# Patient Record
Sex: FEMALE | Race: OTHER | NOT HISPANIC OR LATINO | Employment: UNEMPLOYED | RURAL
[De-identification: names, ages, dates, MRNs, and addresses within clinical notes are randomized per-mention and may not be internally consistent; named-entity substitution may affect disease eponyms.]

---

## 2021-08-23 ENCOUNTER — HOSPITAL ENCOUNTER (EMERGENCY)
Facility: HOSPITAL | Age: 56
Discharge: HOME | End: 2021-08-24
Attending: FAMILY MEDICINE
Payer: MEDICARE

## 2021-08-23 VITALS
RESPIRATION RATE: 16 BRPM | OXYGEN SATURATION: 97 % | HEART RATE: 59 BPM | TEMPERATURE: 98 F | SYSTOLIC BLOOD PRESSURE: 171 MMHG | DIASTOLIC BLOOD PRESSURE: 90 MMHG | HEIGHT: 63 IN | WEIGHT: 165 LBS | BODY MASS INDEX: 29.23 KG/M2
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
## 2021-08-23 DIAGNOSIS — S42.215A CLOSED NONDISPLACED FRACTURE OF SURGICAL NECK OF LEFT HUMERUS, UNSPECIFIED FRACTURE MORPHOLOGY, INITIAL ENCOUNTER: Primary | ICD-10-CM

## 2021-08-23 DIAGNOSIS — R42 DIZZINESS: ICD-10-CM

## 2021-08-23 LAB
ALBUMIN SERPL BCP-MCNC: 3.3 G/DL (ref 3.5–5)
ALBUMIN/GLOB SERPL: 0.5 {RATIO}
ALP SERPL-CCNC: 230 U/L (ref 46–118)
ALT SERPL W P-5'-P-CCNC: 25 U/L (ref 13–56)
ANION GAP SERPL CALCULATED.3IONS-SCNC: 9 MMOL/L (ref 7–16)
AST SERPL W P-5'-P-CCNC: 39 U/L (ref 15–37)
BASOPHILS # BLD AUTO: 0.01 K/UL (ref 0–0.2)
BASOPHILS NFR BLD AUTO: 0.2 % (ref 0–1)
BILIRUB SERPL-MCNC: 0.8 MG/DL (ref 0–1.2)
BUN SERPL-MCNC: 26 MG/DL (ref 7–18)
BUN/CREAT SERPL: 6 (ref 6–20)
CALCIUM SERPL-MCNC: 8.6 MG/DL (ref 8.5–10.1)
CHLORIDE SERPL-SCNC: 95 MMOL/L (ref 98–107)
CO2 SERPL-SCNC: 33 MMOL/L (ref 21–32)
CREAT SERPL-MCNC: 4.32 MG/DL (ref 0.55–1.02)
DIFFERENTIAL METHOD BLD: ABNORMAL
EOSINOPHIL # BLD AUTO: 0.21 K/UL (ref 0–0.5)
EOSINOPHIL NFR BLD AUTO: 4 % (ref 1–4)
ERYTHROCYTE [DISTWIDTH] IN BLOOD BY AUTOMATED COUNT: 15.4 % (ref 11.5–14.5)
GLOBULIN SER-MCNC: 6.8 G/DL (ref 2–4)
GLUCOSE SERPL-MCNC: 100 MG/DL (ref 74–106)
GLUCOSE SERPL-MCNC: 88 MG/DL (ref 70–105)
HCT VFR BLD AUTO: 32.6 % (ref 38–47)
HGB BLD-MCNC: 11.2 G/DL (ref 12–16)
IMM GRANULOCYTES # BLD AUTO: 0.03 K/UL (ref 0–0.04)
IMM GRANULOCYTES NFR BLD: 0.6 % (ref 0–0.4)
LYMPHOCYTES # BLD AUTO: 1.28 K/UL (ref 1–4.8)
LYMPHOCYTES NFR BLD AUTO: 24.2 % (ref 27–41)
MCH RBC QN AUTO: 32.7 PG (ref 27–31)
MCHC RBC AUTO-ENTMCNC: 34.4 G/DL (ref 32–36)
MCV RBC AUTO: 95.3 FL (ref 80–96)
MONOCYTES # BLD AUTO: 0.45 K/UL (ref 0–0.8)
MONOCYTES NFR BLD AUTO: 8.5 % (ref 2–6)
MPC BLD CALC-MCNC: 10.4 FL (ref 9.4–12.4)
NEUTROPHILS # BLD AUTO: 3.31 K/UL (ref 1.8–7.7)
NEUTROPHILS NFR BLD AUTO: 62.5 % (ref 53–65)
NRBC # BLD AUTO: 0 X10E3/UL
NRBC, AUTO (.00): 0 %
PLATELET # BLD AUTO: 119 K/UL (ref 150–400)
POTASSIUM SERPL-SCNC: 4.4 MMOL/L (ref 3.5–5.1)
PROT SERPL-MCNC: 10.1 G/DL (ref 6.4–8.2)
RBC # BLD AUTO: 3.42 M/UL (ref 4.2–5.4)
SODIUM SERPL-SCNC: 133 MMOL/L (ref 136–145)
WBC # BLD AUTO: 5.29 K/UL (ref 4.5–11)

## 2021-08-23 PROCEDURE — 96374 THER/PROPH/DIAG INJ IV PUSH: CPT

## 2021-08-23 PROCEDURE — 93005 ELECTROCARDIOGRAM TRACING: CPT

## 2021-08-23 PROCEDURE — 80053 COMPREHEN METABOLIC PANEL: CPT | Performed by: FAMILY MEDICINE

## 2021-08-23 PROCEDURE — 99284 EMERGENCY DEPT VISIT MOD MDM: CPT | Mod: ,,, | Performed by: FAMILY MEDICINE

## 2021-08-23 PROCEDURE — 36415 COLL VENOUS BLD VENIPUNCTURE: CPT | Performed by: FAMILY MEDICINE

## 2021-08-23 PROCEDURE — 93010 EKG 12-LEAD: ICD-10-PCS | Mod: ,,, | Performed by: HOSPITALIST

## 2021-08-23 PROCEDURE — 96375 TX/PRO/DX INJ NEW DRUG ADDON: CPT

## 2021-08-23 PROCEDURE — 85025 COMPLETE CBC W/AUTO DIFF WBC: CPT | Performed by: FAMILY MEDICINE

## 2021-08-23 PROCEDURE — 99284 PR EMERGENCY DEPT VISIT,LEVEL IV: ICD-10-PCS | Mod: ,,, | Performed by: FAMILY MEDICINE

## 2021-08-23 PROCEDURE — 63600175 PHARM REV CODE 636 W HCPCS: Performed by: FAMILY MEDICINE

## 2021-08-23 PROCEDURE — 99285 EMERGENCY DEPT VISIT HI MDM: CPT | Mod: 25

## 2021-08-23 PROCEDURE — 93010 ELECTROCARDIOGRAM REPORT: CPT | Mod: ,,, | Performed by: HOSPITALIST

## 2021-08-23 PROCEDURE — 82962 GLUCOSE BLOOD TEST: CPT

## 2021-08-23 RX ORDER — MORPHINE SULFATE 4 MG/ML
4 INJECTION, SOLUTION INTRAMUSCULAR; INTRAVENOUS
Status: COMPLETED | OUTPATIENT
Start: 2021-08-23 | End: 2021-08-23

## 2021-08-23 RX ORDER — ONDANSETRON 2 MG/ML
4 INJECTION INTRAMUSCULAR; INTRAVENOUS
Status: COMPLETED | OUTPATIENT
Start: 2021-08-23 | End: 2021-08-23

## 2021-08-23 RX ORDER — HYDROCODONE BITARTRATE AND ACETAMINOPHEN 10; 325 MG/1; MG/1
1 TABLET ORAL EVERY 6 HOURS PRN
Qty: 15 TABLET | Refills: 0 | Status: SHIPPED | OUTPATIENT
Start: 2021-08-23 | End: 2022-03-03

## 2021-08-23 RX ADMIN — MORPHINE SULFATE 4 MG: 4 INJECTION INTRAVENOUS at 11:08

## 2021-08-23 RX ADMIN — ONDANSETRON 4 MG: 2 INJECTION INTRAMUSCULAR; INTRAVENOUS at 11:08

## 2021-09-02 ENCOUNTER — HOSPITAL ENCOUNTER (OUTPATIENT)
Dept: RADIOLOGY | Facility: HOSPITAL | Age: 56
Discharge: HOME | End: 2021-09-02
Attending: ORTHOPAEDIC SURGERY
Payer: MEDICARE

## 2021-09-02 DIAGNOSIS — M25.512 ACUTE PAIN OF LEFT SHOULDER: ICD-10-CM

## 2021-09-02 PROBLEM — S42.222A CLOSED 2-PART DISPLACED FRACTURE OF SURGICAL NECK OF LEFT HUMERUS: Status: ACTIVE | Noted: 2021-09-02

## 2021-09-02 PROCEDURE — 73030 X-RAY EXAM OF SHOULDER: CPT | Mod: TC,LT

## 2021-09-30 ENCOUNTER — HOSPITAL ENCOUNTER (OUTPATIENT)
Dept: RADIOLOGY | Facility: HOSPITAL | Age: 56
Discharge: HOME | End: 2021-09-30
Attending: ORTHOPAEDIC SURGERY
Payer: MEDICARE

## 2021-09-30 DIAGNOSIS — Z47.89 ORTHOPEDIC AFTERCARE: ICD-10-CM

## 2021-09-30 PROCEDURE — 73030 X-RAY EXAM OF SHOULDER: CPT | Mod: TC,LT

## 2021-10-21 ENCOUNTER — HOSPITAL ENCOUNTER (OUTPATIENT)
Dept: RADIOLOGY | Facility: HOSPITAL | Age: 56
Discharge: HOME | End: 2021-10-21
Attending: ORTHOPAEDIC SURGERY
Payer: MEDICARE

## 2021-10-21 DIAGNOSIS — Z47.89 ORTHOPEDIC AFTERCARE: ICD-10-CM

## 2021-10-21 PROCEDURE — 73030 X-RAY EXAM OF SHOULDER: CPT | Mod: TC,LT

## 2021-11-18 ENCOUNTER — HOSPITAL ENCOUNTER (OUTPATIENT)
Dept: RADIOLOGY | Facility: HOSPITAL | Age: 56
Discharge: HOME | End: 2021-11-18
Attending: ORTHOPAEDIC SURGERY
Payer: MEDICARE

## 2021-11-18 DIAGNOSIS — Z47.89 ORTHOPEDIC AFTERCARE: ICD-10-CM

## 2021-11-18 PROCEDURE — 73030 X-RAY EXAM OF SHOULDER: CPT | Mod: TC,LT

## 2021-12-21 ENCOUNTER — HOSPITAL ENCOUNTER (OUTPATIENT)
Dept: RADIOLOGY | Facility: HOSPITAL | Age: 56
Discharge: HOME | End: 2021-12-21
Attending: ORTHOPAEDIC SURGERY
Payer: MEDICARE

## 2021-12-21 DIAGNOSIS — Z47.89 ORTHOPEDIC AFTERCARE: ICD-10-CM

## 2021-12-21 PROCEDURE — 73030 X-RAY EXAM OF SHOULDER: CPT | Mod: TC,LT

## 2021-12-28 ENCOUNTER — TELEPHONE (OUTPATIENT)
Dept: TRANSPLANT | Facility: CLINIC | Age: 56
End: 2021-12-28

## 2022-01-11 ENCOUNTER — TELEPHONE (OUTPATIENT)
Dept: TRANSPLANT | Facility: CLINIC | Age: 57
End: 2022-01-11
Payer: MEDICARE

## 2022-01-13 DIAGNOSIS — Z76.82 ORGAN TRANSPLANT CANDIDATE: Primary | ICD-10-CM

## 2022-01-20 ENCOUNTER — HOSPITAL ENCOUNTER (OUTPATIENT)
Dept: RADIOLOGY | Facility: HOSPITAL | Age: 57
Discharge: HOME | End: 2022-01-20
Attending: ORTHOPAEDIC SURGERY
Payer: MEDICARE

## 2022-01-20 DIAGNOSIS — Z47.89 ORTHOPEDIC AFTERCARE: ICD-10-CM

## 2022-01-20 PROCEDURE — 73030 X-RAY EXAM OF SHOULDER: CPT | Mod: TC,LT

## 2022-01-25 ENCOUNTER — TELEPHONE (OUTPATIENT)
Dept: TRANSPLANT | Facility: CLINIC | Age: 57
End: 2022-01-25
Payer: MEDICARE

## 2022-03-03 ENCOUNTER — HOSPITAL ENCOUNTER (OUTPATIENT)
Dept: RADIOLOGY | Facility: HOSPITAL | Age: 57
Discharge: HOME | End: 2022-03-03
Attending: NURSE PRACTITIONER
Payer: MEDICARE

## 2022-03-03 ENCOUNTER — TELEPHONE (OUTPATIENT)
Dept: TRANSPLANT | Facility: CLINIC | Age: 57
End: 2022-03-03
Payer: MEDICARE

## 2022-03-03 ENCOUNTER — OFFICE VISIT (OUTPATIENT)
Dept: TRANSPLANT | Facility: CLINIC | Age: 57
End: 2022-03-03
Payer: MEDICARE

## 2022-03-03 ENCOUNTER — DOCUMENTATION ONLY (OUTPATIENT)
Dept: TRANSPLANT | Facility: CLINIC | Age: 57
End: 2022-03-03
Payer: MEDICARE

## 2022-03-03 VITALS
HEART RATE: 69 BPM | RESPIRATION RATE: 17 BRPM | TEMPERATURE: 97 F | BODY MASS INDEX: 30.76 KG/M2 | HEIGHT: 62 IN | WEIGHT: 167.13 LBS | OXYGEN SATURATION: 95 % | SYSTOLIC BLOOD PRESSURE: 170 MMHG | DIASTOLIC BLOOD PRESSURE: 76 MMHG

## 2022-03-03 DIAGNOSIS — Z76.82 ORGAN TRANSPLANT CANDIDATE: ICD-10-CM

## 2022-03-03 DIAGNOSIS — G45.9 TIA (TRANSIENT ISCHEMIC ATTACK): ICD-10-CM

## 2022-03-03 DIAGNOSIS — I10 ESSENTIAL HYPERTENSION: ICD-10-CM

## 2022-03-03 DIAGNOSIS — N18.6 ESRD (END STAGE RENAL DISEASE): ICD-10-CM

## 2022-03-03 DIAGNOSIS — Z01.818 PRE-TRANSPLANT EVALUATION FOR KIDNEY TRANSPLANT: Primary | ICD-10-CM

## 2022-03-03 PROCEDURE — 99203 OFFICE O/P NEW LOW 30 MIN: CPT | Mod: S$PBB,TXP,, | Performed by: TRANSPLANT SURGERY

## 2022-03-03 PROCEDURE — 72170 X-RAY EXAM OF PELVIS: CPT | Mod: TC,TXP

## 2022-03-03 PROCEDURE — 71046 XR CHEST PA AND LATERAL: ICD-10-PCS | Mod: 26,TXP,, | Performed by: RADIOLOGY

## 2022-03-03 PROCEDURE — 76770 US EXAM ABDO BACK WALL COMP: CPT | Mod: TC,TXP

## 2022-03-03 PROCEDURE — 99999 PR PBB SHADOW E&M-EST. PATIENT-LVL IV: ICD-10-PCS | Mod: PBBFAC,TXP,, | Performed by: NURSE PRACTITIONER

## 2022-03-03 PROCEDURE — 99214 OFFICE O/P EST MOD 30 MIN: CPT | Mod: PBBFAC,25,TXP | Performed by: NURSE PRACTITIONER

## 2022-03-03 PROCEDURE — 72170 XR PELVIS ROUTINE AP: ICD-10-PCS | Mod: 26,TXP,, | Performed by: RADIOLOGY

## 2022-03-03 PROCEDURE — 99999 PR PBB SHADOW E&M-EST. PATIENT-LVL IV: CPT | Mod: PBBFAC,TXP,, | Performed by: NURSE PRACTITIONER

## 2022-03-03 PROCEDURE — 71046 X-RAY EXAM CHEST 2 VIEWS: CPT | Mod: TC,TXP

## 2022-03-03 PROCEDURE — 72170 X-RAY EXAM OF PELVIS: CPT | Mod: 26,TXP,, | Performed by: RADIOLOGY

## 2022-03-03 PROCEDURE — 71046 X-RAY EXAM CHEST 2 VIEWS: CPT | Mod: 26,TXP,, | Performed by: RADIOLOGY

## 2022-03-03 PROCEDURE — 93978 US DOPP ILIACS BILATERAL: ICD-10-PCS | Mod: 26,TXP,, | Performed by: STUDENT IN AN ORGANIZED HEALTH CARE EDUCATION/TRAINING PROGRAM

## 2022-03-03 PROCEDURE — 93978 VASCULAR STUDY: CPT | Mod: 26,TXP,, | Performed by: STUDENT IN AN ORGANIZED HEALTH CARE EDUCATION/TRAINING PROGRAM

## 2022-03-03 PROCEDURE — 93978 VASCULAR STUDY: CPT | Mod: TC,TXP

## 2022-03-03 PROCEDURE — 76770 US RETROPERITONEAL COMPLETE: ICD-10-PCS | Mod: 26,TXP,, | Performed by: STUDENT IN AN ORGANIZED HEALTH CARE EDUCATION/TRAINING PROGRAM

## 2022-03-03 PROCEDURE — 99205 PR OFFICE/OUTPT VISIT, NEW, LEVL V, 60-74 MIN: ICD-10-PCS | Mod: S$PBB,TXP,, | Performed by: NURSE PRACTITIONER

## 2022-03-03 PROCEDURE — 99203 PR OFFICE/OUTPT VISIT, NEW, LEVL III, 30-44 MIN: ICD-10-PCS | Mod: S$PBB,TXP,, | Performed by: TRANSPLANT SURGERY

## 2022-03-03 PROCEDURE — 76770 US EXAM ABDO BACK WALL COMP: CPT | Mod: 26,TXP,, | Performed by: STUDENT IN AN ORGANIZED HEALTH CARE EDUCATION/TRAINING PROGRAM

## 2022-03-03 PROCEDURE — 99205 OFFICE O/P NEW HI 60 MIN: CPT | Mod: S$PBB,TXP,, | Performed by: NURSE PRACTITIONER

## 2022-03-03 RX ORDER — HYDRALAZINE HYDROCHLORIDE 10 MG/1
10 TABLET, FILM COATED ORAL EVERY 12 HOURS
COMMUNITY
End: 2022-11-03 | Stop reason: SDUPTHER

## 2022-03-03 RX ORDER — FERROUS SULFATE, DRIED 160(50) MG
1 TABLET, EXTENDED RELEASE ORAL DAILY
COMMUNITY
End: 2022-11-03 | Stop reason: SDUPTHER

## 2022-03-03 RX ORDER — SUCROFERRIC OXYHYDROXIDE 500 MG/1
500 TABLET, CHEWABLE ORAL 3 TIMES DAILY
COMMUNITY

## 2022-03-03 NOTE — LETTER
March 4, 2022        Bo Ramsey Jr.  [address] [place] [address]  [facility] [place] [address]  Phone: [phone]  Fax: [phone]             [facility]- [facility]  [address]  [facility] [address]  Phone: [phone]   Patient: Caron Esquivel   MR Number: [id number]   YOB: 1965   Date of Visit: 3/3/2022       Dear Dr. Bo Ramsey Jr.    Thank you for referring Caron Esquivel to me for evaluation. Attached you will find relevant portions of my assessment and plan of care.    If you have questions, please do not hesitate to call me. I look forward to following Caron Esquivel along with you.    Sincerely,    Emily Ramirez, NP    Enclosure    If you would like to receive this communication electronically, please contact [email or web address] or [phone] to request [employer] Link access.    [employer] Link is a tool which provides read-only access to select patient information with whom you have a relationship. Its easy to use and provides real time access to review your patients record including encounter summaries, notes, results, and demographic information.    If you feel you have received this communication in error or would no longer like to receive these types of communications, please e-mail [email or web address]

## 2022-03-03 NOTE — PROGRESS NOTES
INITIAL PATIENT EDUCATION NOTE    Ms. Caron Esquivel was seen in pre-kidney transplant clinic for evaluation for kidney, kidney/pancreas or pancreas only transplant.  The patient attended a an individual video education session that discussed/reviewed the following aspects of transplantation: evaluation and selection committee process, UNOS waitlist management/multiple listings, types of organs offered (KDPI < 85%, KDPI > 85%, PHS risk, DCD, HCV+, HIV+ for HIV+ recipients and enbloc/dual), financial aspects, surgical procedures, dietary instruction pre- and post-transplant, health maintenance pre- and post-transplant, post-transplant hospitalization and outpatient follow-up, potential to participate in a research protocol, and medication management and side effects.  A question and answer session was provided after the presentation.    The patient was seen by all members of the multi-disciplinary team to include: Nephrologist/PA, Surgeon, , Transplant Coordinator, , Pharmacist and Dietician (if applicable).    The patient reviewed and signed all consents for evaluation which were witnessed and sent to scanning into the EPIC chart.    The patient was given an education book and plan for further evaluation based on her individual assessment.      Reviewed program requirement for complete COVID vaccination with documentation prior to listing.  COVID education information reviewed with patient.     The patient was informed that the transplant team would manage immediate post op pain. If the patient requires long term pain management, they will need to have that pain management addressed by their PCP or previous provider who wrote for long term pain medicines.    The patient was encouraged to call with any questions or concerns.

## 2022-03-03 NOTE — PROGRESS NOTES
"   Transplant Nephrology  Kidney Transplant Recipient Evaluation    Referring Physician: Bo Ramsey Jr.  Current Nephrologist: Bo Ramsey Jr.    Subjective:   CC:  Initial evaluation of kidney transplant candidacy.    HPI:  Ms. Esquivel is a 57 y.o. year old  [place] female who has presented to be evaluated as a potential kidney transplant recipient.  She has ESRD secondary to diabetic nephropathy.  Patient is currently on hemodialysis started on 3/23/21. Patient is dialyzing on MWF schedule.  Patient reports that she is tolerating dialysis well.. Reports intermittent hypotension with dialysis. She has a dialysis catheter for dialysis access.     Body mass index is 30.6 kg/m².    Previous Transplant: no  Previous Blood Transfusion: yes  Previous Pregnancy: 4, reports HTN during pregnancies  Previous neurogenic bladder/ urine incontinence: still makes some urine, + stress incontinence  Anticoagulation/ antiplatelet therapy and reason: no  Potential Donor: yes  High KDPI candidate: yes  Meets center eligibility for accepting HCV+ donor offer: no declines    DM--Dx  In late 80s  Lab Results   Component Value Date    HGBA1C 4.7 03/03/2022   + neuropathy, retinopathy     HTN--Dx in late 80s  BP Readings from Last 3 Encounters:   03/03/22 (!) 170/76   08/23/21 (!) 171/90     TIA --3-4 months ago    Severe cirrhosis noted on recent CT scan in [place]. Patient was unaware. Denies liver disease or alcohol use    Reports recent angiogram with "good arteries"     [facility]  Lab Results   Component Value Date    CALCIUM 8.6 08/23/2021       Denies heart disease, pulmonary disease, liver disease, stroke, DVt/PE, chronic wounds/infections/amputations.    Functional Status:  Patient reports she can only walk about 1 block before stopping due to SOB. Denies CP or claudication.       Current Outpatient Medications:     brimonidine 0.2% (ALPHAGAN) 0.2 % Drop, Apply 1 drop to eye., Disp: , Rfl:     calcium-vitamin D3 (OS- " + D3) 500 mg-5 mcg (200 unit) per tablet, Take 1 tablet by mouth once daily., Disp: , Rfl:     carvediloL (COREG) 3.125 MG tablet, Take 3.125 mg by mouth., Disp: , Rfl:     hydrALAZINE (APRESOLINE) 10 MG tablet, Take 10 mg by mouth every 12 (twelve) hours., Disp: , Rfl:     latanoprost 0.005 % ophthalmic solution, Apply 1 drop to eye nightly., Disp: , Rfl:     sucroferric oxyhydroxide (VELPHORO) 500 mg Chew, Take 500 mg by mouth 3 (three) times daily., Disp: , Rfl:     dorzolamide-timolol 2-0.5% (COSOPT) 22.3-6.8 mg/mL ophthalmic solution, Apply 1 drop to eye 2 (two) times daily., Disp: , Rfl:       Past Medical and Surgical History: Ms. Esquivel  has a past medical history of Diabetes mellitus, Hypertension, and Kidney disease.  She has no past surgical history on file.    Past Social and Family History: Ms. Esquivel reports that she has never smoked. She has never used smokeless tobacco. She reports previous alcohol use. She reports that she does not use drugs. Her family history includes Cancer in her mother; Diabetes in her father and mother; Hypertension in her father and mother; No Known Problems in her brother; Stroke in her mother.    Review of Systems   Constitutional: Negative for appetite change, chills, fatigue and fever.   HENT: Negative for trouble swallowing.    Eyes: Positive for visual disturbance.   Respiratory: Negative for cough, chest tightness, shortness of breath and wheezing.    Cardiovascular: Negative for chest pain, palpitations and leg swelling.   Gastrointestinal: Negative for abdominal pain, constipation, diarrhea and nausea.   Genitourinary: Positive for decreased urine volume. Negative for difficulty urinating, frequency and urgency.   Musculoskeletal: Positive for arthralgias. Negative for myalgias.   Skin: Negative for rash.   Neurological: Negative for dizziness, weakness, light-headedness and headaches.   Psychiatric/Behavioral: Negative for sleep disturbance.  "      Objective:   Blood pressure (!) 170/76, pulse 69, temperature 97.2 °F (36.2 °C), temperature source Tympanic, resp. rate 17, height 5' 1.97" (1.574 m), weight 75.8 kg (167 lb 1.7 oz), SpO2 95 %.body mass index is 30.6 kg/m².    Physical Exam  Constitutional:       General: She is not in acute distress.     Appearance: She is well-developed. She is not diaphoretic.   Cardiovascular:      Rate and Rhythm: Normal rate and regular rhythm.      Heart sounds: Normal heart sounds. No murmur heard.    No friction rub. No gallop.   Pulmonary:      Effort: Pulmonary effort is normal. No respiratory distress.      Breath sounds: Normal breath sounds. No wheezing or rales.   Abdominal:      General: Bowel sounds are normal. There is no distension.      Palpations: Abdomen is soft.      Tenderness: There is no abdominal tenderness.   Musculoskeletal:         General: No tenderness. Normal range of motion.   Skin:     General: Skin is warm and dry.      Findings: No rash.      Nails: There is no clubbing.          Neurological:      Mental Status: She is alert and oriented to person, place, and time.   Psychiatric:         Behavior: Behavior normal.         Labs:  Lab Results   Component Value Date    WBC 4.41 03/03/2022    HGB 11.4 (L) 03/03/2022    HCT 34.5 (L) 03/03/2022     (L) 08/23/2021    K 4.4 08/23/2021    CL 95 (L) 08/23/2021    CO2 33 (H) 08/23/2021    BUN 26 (H) 08/23/2021    CREATININE 4.32 (H) 08/23/2021    EGFRNONAA 11 (L) 08/23/2021    CALCIUM 8.6 08/23/2021    ALBUMIN 3.3 (L) 08/23/2021    AST 39 (H) 08/23/2021    ALT 25 08/23/2021       No results found for: PREALBUMIN, BILIRUBINUA, GGT, AMYLASE, LIPASE, PROTEINUA, NITRITE, RBCUA, WBCUA    No results found for: HLAABCTYPE    Labs were reviewed with the patient.    Assessment:     1. Pre-transplant evaluation for kidney transplant    2. ESRD (end stage renal disease)    3. Essential hypertension    4. History of TIA (transient ischemic attack)  "       Plan:   Prior to Listing, will need the following items to be completed:  1. Standard serologies, cardiac and imaging studies   2. Cardiology evaluation  3. Severe cirrhosis on CT in [place]---obtain ABD US and needs Hepatology evaluation  4. Obtain CD of CT ABD/Pelvis that is in [place] and have imaging uploaded  5. Total protein elevated in the 10s---obtain SPEP, UPEP, and free light chains  6. Obtain recent cardiac cath results  7. Needs MMG/PAP/GYN  8. Colonoscopy   9. Obtain 2 month BP reading from dialysis center      Transplant Candidacy:   Based on available information, Ms. Esquivel is a high-risk kidney transplant candidate. Recent TIA  Meets center eligibility for accepting HCV+ donor offer - yes.  Patient educated on HCV+ donors. Caron is willing to accept HCV+ donor offer - no declines  Patient is a candidate for KDPI > 85 kidney donor offer - yes.  Final determination of transplant candidacy will be made once workup is complete and reviewed by the selection committee.    Patient advised that it is recommended that all transplant candidates, and their close contacts and household members receive Covid vaccination.    Emily Ramirez NP       Counseling:  Exercise: reminded patient of the importance of regular exercise for weight management, blood sugar and blood pressure management.  I also explained exercise has been shown to improve cardiovascular health, energy level, and sleep hygiene.  Lastly, I advised her that cardiovascular complications are leading cause of death for renal transplant recipients, and regular exercise can help lower this risk.    We discussed various aspects of kidney transplantation including transplant surgery, immunosuppressive medications and the need for close follow up. We also discussed side effects of immunosuppression including weight gain, hypertension, hyperlipidemia, new-onset diabetes after transplantation, infections and malignancies, especially skin cancers and  lymphomas. I also reviewed the risk of acute rejection, vascular thrombosis, recurrent disease and potential transmission of infections such as hepatitis and HIV. I informed the patient that the average time on the wait list in the [facility] is between 5 to 7 years.       UNOS Patient Status  Functional Status: 60% - Requires occasional assistance but is able to care for needs  Physical Capacity: No Limitations

## 2022-03-03 NOTE — TELEPHONE ENCOUNTER
Reviewed pt transplant labs.  Notified dialysis unit dietitian of the following abnormal labs via fax and requested their most recent nutrition note on this pt.  Once this note is received it will be scanned into pt's chart.    Alb 3.3

## 2022-03-03 NOTE — PROGRESS NOTES
PHARM.D. PRE-TRANSPLANT NOTE:    This patient's medication therapy was evaluated as part of her pre-transplant evaluation.      The following general pharmacologic concerns were noted: None    The following concerns for post-operative pain management were noted: None    The following pharmacologic concerns related to HCV therapy were noted: None      This patient's medication profile was reviewed for considerations for DAA Hepatitis C therapy:    [X]  No current inducers of CYP 3A4 or PGP  [X]  No amiodarone on this patient's EMR profile in the last 24 months  [X]  No past or current atrial fibrillation on this patient's EMR profile       Current Outpatient Medications   Medication Sig Dispense Refill    brimonidine 0.2% (ALPHAGAN) 0.2 % Drop Apply 1 drop to eye.      calcium-vitamin D3 (OS- + D3) 500 mg-5 mcg (200 unit) per tablet Take 1 tablet by mouth once daily.      carvediloL (COREG) 3.125 MG tablet Take 3.125 mg by mouth.      hydrALAZINE (APRESOLINE) 10 MG tablet Take 10 mg by mouth every 12 (twelve) hours.      latanoprost 0.005 % ophthalmic solution Apply 1 drop to eye nightly.      sucroferric oxyhydroxide (VELPHORO) 500 mg Chew Take 500 mg by mouth 3 (three) times daily.      dorzolamide-timolol 2-0.5% (COSOPT) 22.3-6.8 mg/mL ophthalmic solution Apply 1 drop to eye 2 (two) times daily.       No current facility-administered medications for this visit.           I am available for consultation and can be contacted, as needed by the other members of the Transplant team.

## 2022-03-05 NOTE — PROGRESS NOTES
"Transplant Recipient Adult Psychosocial Assessment    Caron Esquivel  [address] [facility] [place] [address]  Telephone Information:   Mobile [phone]   Home  [phone] (home)  Work  There is no work phone number on file.  E-mail  No e-mail address on record    Sex: female  YOB: 1965  Age: 57 y.o.    Encounter Date: 3/3/2022  [place] Citizen: yes  Primary Language: [place]   Needed: no    Emergency Contact:  Name: Steve Quezada  Relationship:   Address: same as patient  Phone Numbers:  [phone] (cell), [phone] (work)    Family/Social Support:   Number of dependents/: none, four children; all adults  Marital history:  once to current ; 4 adult children  Other family dynamics: Parents , 2/4 sisters , one brother, estranged, two sons living with pt and spouse.  One son "mildly mentally retarded".    Household Composition:  Name: Zohra Esquivel  Age: 57 and 61  Relationship: patient and   Does person drive? pt's  drives, pt does not.  She never learned to drive.    Name: Mitch Esquivel  Age: 32  Relationship: son  Does person drive? yes    Name: Garrett Esquivel   Age: 21  Relationship: son  Does person drive? no    Do you and your caregivers have access to reliable transportation? yes  PRIMARY CAREGIVER: Steve Esquivel will be primary caregiver, phone number [phone].      provided in-depth information to patient and caregiver regarding pre- and post-transplant caregiver role.   strongly encourages patient and caregiver to have concrete plan regarding post-transplant care giving, including back-up caregiver(s) to ensure care giving needs are met as needed.    Patient and Caregiver states understanding all aspects of caregiver role/commitment and is able/willing/committed to being caregiver to the fullest extent necessary.    Patient and Caregiver verbalizes understanding of " the education provided today and caregiver responsibilities.         remains available. Patient and Caregiver agree to contact  in a timely manner if concerns arise.      Able to take time off work without financial concerns: yes.     Additional Significant Others who will Assist with Transplant:  Name: Florence Koch  [phone]  Age: 33  City: [place]  State: [place]  Relationship: daughter  Does person drive? yes    Living Will: no  Healthcare Power of : no  Advance Directives on file: <<no information> per medical record.  Verbally reviewed LW/HCPA information.   provided patient with copy of LW/HCPA documents and provided education on completion of forms.    Living Donors: No. Education and resource information given to patient.    Highest Education Level: High School (9-12) or GED  Reading Ability: 11th grade  Reports difficulty with: reading, writing, seeing and due to glaucoma  Learns Best By:  demonstration     Status: no  VA Benefits: no     Working for Income: No  If no, reason not working: Patient Choice - Homemaker    Spouse/Significant Other Employment: works at a [employer] owned dry     Disabled: yes: date disability began: 2021, due to: ESRD.    Monthly Income:  Employment Disability: $1000  Able to afford all costs now and if transplanted, including medications: yes  Patient and Caregiver verbalizes understanding of personal responsibilities related to transplant costs and the importance of having a financial plan to ensure that patients transplant costs are fully covered.       provided fundraising information/education. Patient and Caregiververbalizes understanding.   remains available.    Insurance:   Payer/Plan Subscr  Sex Relation Sub. Ins. ID Effective Group Num   1. LANCE HANNA* DEMETRI PABLO 1965 Female Self [id number] 1965                                    [address] [place] [address]   2.  [place] - [place]* DEMETRI PABLO 1965 Female Self [id number] 6/1/21                                    [address]   Patient states she has Medicare and Medicaid through the [facility].    Primary Insurance (for UNOS reporting): Public Insurance - Other Government  Secondary Insurance (for UNOS reporting): Public Insurance - Medicare FFS (Fee For Service)  Patient and Caregiver verbalizes clear understanding that patient may experience difficulty obtaining and/or be denied insurance coverage post-surgery. This includes and is not limited to disability insurance, life insurance, health insurance, burial insurance, long term care insurance, and other insurances.      Patient and Caregiver also reports understanding that future health concerns related to or unrelated to transplantation may not be covered by patient's insurance.  Resources and information provided and reviewed.     Patient and Caregiver provides verbal permission to release any necessary information to outside resources for patient care and discharge planning.  Resources and information provided are reviewed.      Dialysis Adherence: Patient reports good adherence to dialysis.  SW sent compliance check form to dialysis unit and awaits answer. Please see separate note for compliance check result.    Infusion Service: patient utilizing? no  Home Health: patient utilizing? no  DME: no  Pulmonary/Cardiac Rehab: denies   ADLS:  Pt states ability to independently accomplish all adls.  States some fatigue after dialysis.    Adherence:   Pt states current and expected compliance with all healthcare recommendations..  Adherence education and counseling provided.     Per History Section:  Past Medical History:   Diagnosis Date    Diabetes mellitus     Hypertension     Kidney disease      Social History     Tobacco Use    Smoking status: Never Smoker    Smokeless tobacco: Never Used   Substance Use Topics    Alcohol use: Not Currently     Social  History     Substance and Sexual Activity   Drug Use Never     Social History     Substance and Sexual Activity   Sexual Activity Not Currently       Per Today's Psychosocial:  Tobacco: none, patient denies any use.  Alcohol: none, patient denies any use.  Illicit Drugs/Non-prescribed Medications: none, patient denies any use.    Patient and Caregiver states clear understanding of the potential impact of substance use as it relates to transplant candidacy and is aware of possible random substance screening.  Substance abstinence/cessation counseling, education and resources provided and reviewed.     Arrests/DWI/Treatment/Rehab: patient denies    Psychiatric History:    Mental Health: Pt denies mental health concerns.  Psychiatrist/Counselor: denies  Medications:  denies  Suicide/Homicide Issues: Pt denies current or past suicidal/homicidal ideation.   Safety at home: Pt denies any home safety concerns.      Knowledge: Patient and Caregiver states having clear understanding and realistic expectations regarding the potential risks and potential benefits of organ transplantation and organ donation and agrees to discuss with health care team members and support system members, as well as to utilize available resources and express questions and/or concerns in order to further facilitate the pt informed decision-making.  Resources and information provided and reviewed.    Patient and Caregiver is aware of [facility]'s affiliation and/or partnership with agencies in home health care, LTAC, SNF, [facility], and other hospitals and clinics.    Understanding: Patient and Caregiver reports having a clear understanding of the many lifetime commitments involved with being a transplant recipient, including costs, compliance, medications, lab work, procedures, appointments, concrete and financial planning, preparedness, timely and appropriate communication of concerns, abstinence (ETOH, tobacco, illicit non-prescribed drugs), adherence to  all health care team recommendations, support system and caregiver involvement, appropriate and timely resource utilization and follow-through, mental health counseling as needed/recommended, and patient and caregiver responsibilities.  Social Service Handbook, resources and detailed educational information provided and reviewed.  Educational information provided.    Patient and Caregiver also reports current and expected compliance with health care regime and states having a clear understanding of the importance of compliance.      Patient and Caregiver reports a clear understanding that risks and benefits may be involved with organ transplantation and with organ donation.       Patient and Caregiver also reports clear understanding that psychosocial risk factors may affect patient, and include but are not limited to feelings of depression, generalized anxiety, anxiety regarding dependence on others, post traumatic stress disorder, feelings of guilt and other emotional and/or mental concerns, and/or exacerbation of existing mental health concerns.  Detailed resources provided and discussed.      Patient and Caregiver agrees to access appropriate resources in a timely manner as needed and/or as recommended, and to communicate concerns appropriately.  Patient and Caregiver also reports a clear understanding of treatment options available.     Patient and Caregiver received education in a group setting.   reviewed education, provided additional information, and answered questions.    Feelings or Concerns: denies    Coping: Identify Patient & Caregiver Strategies to [place]:   1. Currently & Pre-transplant - family support, spending time with grandchildren, talking with friends on the phone, doing puzzles and yard sales   2. At the time of surgery - family support   3. During post-Transplant & Recovery Period - family support    Goals: get off dialysis and feel better.  Patient referred to Vocational  Rehabilitation.    Interview Behavior: Patient and Caregiver presents as alert and oriented x 4, pleasant, good eye contact, well groomed, recall good, concentration/judgement good, average intelligence, calm, communicative, cooperative and asking and answering questions appropriately. She was accompanied by her  who presented as supportive of pt's pursuit of transplant.         Transplant Social Work - Candidacy  Assessment/Plan:     Psychosocial Suitability: Based on psychosocial risk factors, patient presents as low risk, due to family/caregiver support, transportation, financial/insurance plan..    Recommendations/Additional Comments: recommend fundraising, pt would benefit from Ruddy Fish LCSW

## 2022-03-08 ENCOUNTER — TELEPHONE (OUTPATIENT)
Dept: TRANSPLANT | Facility: CLINIC | Age: 57
End: 2022-03-08
Payer: MEDICARE

## 2022-03-08 NOTE — TELEPHONE ENCOUNTER
"DIALYSIS COMPLIANCE CHECK:  In the past 90 days:    AMAs:  "N/A"    No-Shows:  "N/A"    Labs/PTH:  , no concerns with labs noted    Caregivers:  No concerns noted      Transportation:  No concerns noted      Psychiatric/Psychosocial/Other concerns:  No concerns noted      Completed by dialysis unit via fax back sheet.  "

## 2022-03-08 NOTE — PROGRESS NOTES
Transplant Surgery  Kidney Transplant Recipient Evaluation    Referring Physician: Bo Ramsey Jr.  Current Nephrologist: Bo Ramsey Jr.    Subjective:     Reason for Visit: evaluate transplant candidacy    History of Present Illness: Caron Esquivel is a 57 y.o. year old female undergoing transplant evaluation.    Dialysis History: Caron is on hemodialysis.      Transplant History: N/A    Etiology of Renal Disease: Diabetes Mellitus - Type II (based on medical records from referral).    External provider notes reviewed: Yes    Review of Systems   Constitutional: Negative.    Respiratory: Negative.    Cardiovascular: Negative.    Gastrointestinal: Negative.    Genitourinary: Positive for decreased urine volume.     Objective:     Physical Exam:  Constitutional:   Vitals reviewed: yes   Well-nourished and well-groomed: yes  Eyes:   Sclerae icteric: no   Extraocular movements intact: yes  GI:    Bowel sounds normal: yes   Tenderness: no    If yes, quadrant/location: not applicable   Palpable masses: no    If yes, quadrant/location: not applicable   Hepatosplenomegaly: no   Ascites: no   Hernia: no    If yes, type/location: not applicable   Surgical scars: no    If yes, type/location: not applicable  Resp:   Effort normal: yes   Breath sounds normal: yes    CV:   Regular rate and rhythm: yes   Heart sounds normal: yes   Femoral pulses normal: yes   Extremities edematous: no  Skin:   Rashes or lesions present: no    If yes, describe:not applicable   Jaundice:: no    Musculoskeletal:   Gait normal: yes   Strength normal: yes  Psych:   Oriented to person, place, and time: yes   Affect and mood normal: yes    Additional comments: not applicable    Diagnostics:  The following labs have been reviewed: CBC  CMP  PT  INR  The following radiology images have been independently reviewed and interpreted: Pelvic Xray  Iliac doppler    Counseling: We provided Caron Esquivel with a group education session today.  We discussed  kidney transplantation at length with her, including risks, potential complications, and alternatives in the management of her renal failure.  The discussion included complications related to anesthesia, bleeding, infection, primary nonfunction, and ATN.  I discussed the typical postoperative course, length of hospitalization, the need for long-term immunosuppression, and the need for long-term routine follow-up.  I discussed living-donor and -donor transplantation and the relative advantages and disadvantages of each.  I also discussed average waiting times for both living donation and  donation.  I discussed national and center-specific survival rates.  I also mentioned the potential benefit of multicenter listing to candidates listed with centers within more than one organ procurement organization.  All questions were answered.    Patient advised that it is recommended that all transplant candidates, and their close contacts and household members receive Covid vaccination.    Final determination of transplant candidacy will be made once evaluation is complete and reviewed by the Kidney & Kidney/Pancreas Selection Committee.    Coronavirus disease (COVID-19) caused by severe acute respiratory virus coronavirus 2 (SARS-C0V 2) is associated with increased mortality in solid organ transplant recipients (SOT) compared to non-transplant patients. Vaccine responses to vaccination are depressed against SARS-CoV2 compared to normal individuals but improve with third vaccination doses. Vaccination prior to SOT provides both the best opportunity for transplant candidates to develop protective immunity and to reduce the risk of serious COVID19 infections post transplantation. Organ transplant candidates at [facility] will be required to receive SARS-CoV-2 vaccination prior to being listed with a an active status, whenever possible. Exceptions will be made for disability  related reasons or for sincerely held [employer] beliefs.          Transplant Surgery - Candidacy   Assessment/Plan:   Caron Esquivel has end stage renal disease (ESRD) on dialysis. I see no surgical contraindication to placing a kidney transplant. Based on available information, Caron Esquivel is a high-risk kidney transplant candidate.     Abdominal Habitus: large, but not absolutely prohibitive.    Vascular/Technical Concerns: None based on exam or available imaging at this time.  No history of vascular disease, no femoral catheters, no lower extremity grafts, no iliofemoral venous thromboembolism. The patient is not on anticoagulation.     Urologic Concerns: None based on available information. Patient reports urinating 1-2x per day.  No history of recurrent UTI, no apparent obstructive symptoms or voiding dysfunction, no history of self catheterization    Other surgical considerations/concerns:  History of cirrhosis, although appears compensated. Outside CT without mention of portal hypertension and only mild splenomegaly.  No additional imaging available.  Thrombocytopenia in the past, but not on present labs.       Additional testing to be completed according to the Written Order Guidelines for Adult Pre-kidney and Pancreas Transplant Evaluation (KI-02).  Interpretation of tests and discussion of patient management involves all members of the multidisciplinary transplant team.    Fer Ortega MD

## 2022-03-15 ENCOUNTER — DOCUMENTATION ONLY (OUTPATIENT)
Dept: TRANSPLANT | Facility: CLINIC | Age: 57
End: 2022-03-15
Payer: MEDICARE

## 2022-03-15 NOTE — LETTER
March 15, 2022    Caron Esquivel  [address] [place] [address] [place] [address]             Dear Dr. Bo Ramsey Jr., Primary Doctor No    Patient: Caron Esquivel   MR Number: [id number]   YOB: 1965     A battery of tests must be done to determine if you are in suitable health to undergo a kidney transplant.  All  the recommended studies must be completed and received by the transplant team before you can be presented to the transplant selection committee. Once all your evaluation is complete the committee will then decide if you are a suitable transplant candidate.  The following studies need to be obtained at home:    _X__Mammography: ICD-10 code Z12.31.    _X__Gynecologic exam: The need for a pap smear will be determined by gynecologist.    _X__Colonoscopy: This is a required screening test for all adults age 45 or above or those considered at risk for colon cancer. ICD-10 code Z12.11.    _X__Cardiac stress test: ICD-10 code N19. We request you to have a stress test to determine if you have any evidence of blockages in your heart.  We usually recommend a nuclear stress test or dobutamine stress echo, given most patients cannot walk on a treadmill long enough to achieve their target heart rate.     _X__2-D echo with color flow doppler: ICD-10 code N19. We need to look at the heart valves and heart muscle, and determine pulmonary artery pressures.      _X__Cardiology consult: We are asking that your cardiologist clear you for transplant surgery and maximize your medical management.  We also need to note if there are special  management strategies that need to be used during your transplant event, especially since we routinely use IV fluids to help the new kidney function at its best.  Also, your heart doctor needs to know that the average wait for a kidney transplant can be as long as 3-5 years.  Thus, we not only ask for a preoperative clearance, but also optimal management of your heart  (for example:  lipids, high blood pressure, heart failure, etc.).    _X__Hepatology consult:due to history of Cirrhosis.    _X__Abdominal Ultrasound Complete: ICD-10 code N19: Due to history of Cirrhosis.    _X__Infectious Disease consult: Due to Positive Strongyloides     _X__Hematology/Oncology consult: Due to SPEP results          You and your doctor should feel free to contact us at any time, if there are questions or concerns about these tests or the transplant evaluation process.    Sincerely,    Joan Boswell MD  Medical Director, Kidney & Kidney/Pancreas Transplantation      [facility]  [address] [place] [id number]  [phone]    Cc: [facility]

## 2022-03-15 NOTE — NURSING
Patient was given during their RR visit a form indicating all testing required to complete their transplant evaluation. All the recommended studies must be completed and received by the transplant team before you can be presented to the transplant selection committee.    The following studies need to be obtained at home:    Cardiology consult  2D Echo with color flow doppler  Cardiac stress test  Colonoscopy  Mammography  Gynecologic exam  ABO  Hepatology Consult  Abdominal US Complete  Patient verbalized understanding of the above.     Attempted to call patient, several attempts, no answer and voicemail has not been set up. Alternate number is the same number listed for patient's number. Patient will need ID consult due to positive Strongyloides and Hematology/Oncology Consult due to SPEP results. Orders will mailed to patient's home address, faxed to patient's dialysis unit and referring MD's office. Lab results were faxed to referring MD and [facility]

## 2022-03-22 ENCOUNTER — HOSPITAL ENCOUNTER (OUTPATIENT)
Dept: RADIOLOGY | Facility: HOSPITAL | Age: 57
Discharge: HOME | End: 2022-03-22
Attending: ORTHOPAEDIC SURGERY
Payer: MEDICARE

## 2022-03-22 DIAGNOSIS — Z47.89 ORTHOPEDIC AFTERCARE: ICD-10-CM

## 2022-03-22 PROCEDURE — 73030 X-RAY EXAM OF SHOULDER: CPT | Mod: TC,LT

## 2022-07-07 ENCOUNTER — TELEPHONE (OUTPATIENT)
Dept: TRANSPLANT | Facility: CLINIC | Age: 57
End: 2022-07-07
Payer: MEDICARE

## 2022-07-07 NOTE — TELEPHONE ENCOUNTER
Spoke with pt regarding 30 letter she have received; pt states she have been having a hard time getting appointments. She have been able to schedule most appointments, but they are in August.

## 2022-09-15 ENCOUNTER — TELEPHONE (OUTPATIENT)
Dept: TRANSPLANT | Facility: CLINIC | Age: 57
End: 2022-09-15
Payer: MEDICARE

## 2022-09-15 ENCOUNTER — TELEPHONE (OUTPATIENT)
Dept: INFECTIOUS DISEASES | Facility: CLINIC | Age: 57
End: 2022-09-15
Payer: MEDICARE

## 2022-09-15 DIAGNOSIS — Z76.82 ORGAN TRANSPLANT CANDIDATE: Primary | ICD-10-CM

## 2022-09-15 NOTE — TELEPHONE ENCOUNTER
----- Message -----   From: Arya Cee MD   Sent: 9/15/2022  10:49 AM CDT   To: Valerie Jordan MA     Yes she can.  Can also see any provider virtually.  If she chooses to not see ID, can give ivermectin 200mcg/kg daily x 2 then repeat 200mcg/kg daily x 2  2 weeks later.   ----- Message -----   From: Valerie Jordan MA   Sent: 9/15/2022  10:34 AM CDT   To: Arya Cee MD

## 2022-09-15 NOTE — TELEPHONE ENCOUNTER
----- Message from Jessica Espinosa RN sent at 9/15/2022  8:53 AM CDT -----  Regarding: needs treatment for strongyloides  Good morning,    I am assisting with the evaluation of this patient for kidney transplant. She was positive for strongyloides in March. Can this be treated without having her come for a clinic visit with ID?     Thanks.     Jessica Espinosa RN

## 2022-09-15 NOTE — TELEPHONE ENCOUNTER
"Spoke to patient regarding her incomplete workup. She received a 30 day letter on 6/29/22. She informed me that she has been trying to schedule appointments at [facility] in [place] but the availability is limited. I informed her that we will either need to schedule her here or close her evaluation. She agreed to come to [facility] for these appointments. I sent a message to Dr. Cee's staff to see if Strongyloides can be treated by calling in a prescription for her. Patient states she is having a GYN exam [place]". I told her I needed a name, phone number and appointment date. I will schedule her for gyn, Hepatology for cirrhosis, Hem/Oc for elevated serum protein/SPEP here and will request Cardiology clearance from [facility].    I informed patient that if she does not come to [facility] for the appointments we schedule for her, her evaluation will be closed. Patient voiced understanding.   "

## 2022-10-11 ENCOUNTER — TELEPHONE (OUTPATIENT)
Dept: HEPATOLOGY | Facility: CLINIC | Age: 57
End: 2022-10-11
Payer: MEDICARE

## 2022-10-11 DIAGNOSIS — R93.2 ABNORMAL LIVER CT: Primary | ICD-10-CM

## 2022-10-11 NOTE — TELEPHONE ENCOUNTER
Referral to hepatology for possible cirrhosis on outside imaging (?). Called pt to schedule appt with MD but pt can only come on the same day as her OB appt (she travels from [place]), so booked with MARGARET with fibroscan before    Mailed appt slips to pt, instructed to come fasting around 130-145 for testing and appt, pt verbalized understanding

## 2022-10-24 ENCOUNTER — OFFICE VISIT (OUTPATIENT)
Dept: HEMATOLOGY/ONCOLOGY | Facility: CLINIC | Age: 57
End: 2022-10-24
Payer: MEDICARE

## 2022-10-24 ENCOUNTER — OFFICE VISIT (OUTPATIENT)
Dept: OBSTETRICS AND GYNECOLOGY | Facility: CLINIC | Age: 57
End: 2022-10-24
Payer: MEDICARE

## 2022-10-24 ENCOUNTER — LAB VISIT (OUTPATIENT)
Dept: LAB | Facility: HOSPITAL | Age: 57
End: 2022-10-24
Payer: MEDICARE

## 2022-10-24 ENCOUNTER — OFFICE VISIT (OUTPATIENT)
Dept: HEPATOLOGY | Facility: CLINIC | Age: 57
End: 2022-10-24
Payer: MEDICARE

## 2022-10-24 ENCOUNTER — PROCEDURE VISIT (OUTPATIENT)
Dept: HEPATOLOGY | Facility: CLINIC | Age: 57
End: 2022-10-24
Payer: MEDICARE

## 2022-10-24 VITALS
TEMPERATURE: 98 F | RESPIRATION RATE: 18 BRPM | SYSTOLIC BLOOD PRESSURE: 214 MMHG | HEIGHT: 61 IN | BODY MASS INDEX: 32.89 KG/M2 | DIASTOLIC BLOOD PRESSURE: 89 MMHG | HEART RATE: 75 BPM | OXYGEN SATURATION: 95 % | WEIGHT: 174.19 LBS

## 2022-10-24 VITALS
OXYGEN SATURATION: 93 % | HEART RATE: 77 BPM | WEIGHT: 174.38 LBS | HEIGHT: 61 IN | SYSTOLIC BLOOD PRESSURE: 234 MMHG | RESPIRATION RATE: 18 BRPM | DIASTOLIC BLOOD PRESSURE: 108 MMHG | BODY MASS INDEX: 32.92 KG/M2 | TEMPERATURE: 99 F

## 2022-10-24 VITALS
WEIGHT: 174.63 LBS | HEIGHT: 61 IN | DIASTOLIC BLOOD PRESSURE: 90 MMHG | BODY MASS INDEX: 32.97 KG/M2 | SYSTOLIC BLOOD PRESSURE: 160 MMHG

## 2022-10-24 DIAGNOSIS — K76.0 FATTY LIVER DISEASE, NONALCOHOLIC: ICD-10-CM

## 2022-10-24 DIAGNOSIS — R94.5 ABNORMAL RESULTS OF LIVER FUNCTION STUDIES: ICD-10-CM

## 2022-10-24 DIAGNOSIS — Z76.82 ORGAN TRANSPLANT CANDIDATE: ICD-10-CM

## 2022-10-24 DIAGNOSIS — D69.6 THROMBOCYTOPENIA: ICD-10-CM

## 2022-10-24 DIAGNOSIS — K74.60 CIRRHOSIS OF LIVER WITHOUT ASCITES, UNSPECIFIED HEPATIC CIRRHOSIS TYPE: ICD-10-CM

## 2022-10-24 DIAGNOSIS — R93.2 ABNORMAL FINDINGS ON DIAGNOSTIC IMAGING OF LIVER: ICD-10-CM

## 2022-10-24 DIAGNOSIS — R35.0 URINARY FREQUENCY: ICD-10-CM

## 2022-10-24 DIAGNOSIS — R93.2 ABNORMAL LIVER CT: ICD-10-CM

## 2022-10-24 DIAGNOSIS — Z01.419 ENCOUNTER FOR ROUTINE GYNECOLOGIC EXAMINATION IN MEDICARE PATIENT: Primary | ICD-10-CM

## 2022-10-24 DIAGNOSIS — Z12.31 BREAST CANCER SCREENING BY MAMMOGRAM: ICD-10-CM

## 2022-10-24 DIAGNOSIS — Z76.82 ORGAN TRANSPLANT CANDIDATE: Primary | ICD-10-CM

## 2022-10-24 DIAGNOSIS — R93.2 ABNORMAL FINDINGS ON DIAGNOSTIC IMAGING OF LIVER: Primary | ICD-10-CM

## 2022-10-24 LAB
A1AT SERPL-MCNC: 141 MG/DL (ref 100–190)
AFP SERPL-MCNC: 2.7 NG/ML (ref 0–8.4)
ALBUMIN SERPL BCP-MCNC: 3.5 G/DL (ref 3.5–5.2)
ALP SERPL-CCNC: 111 U/L (ref 55–135)
ALT SERPL W/O P-5'-P-CCNC: 11 U/L (ref 10–44)
ANION GAP SERPL CALC-SCNC: 12 MMOL/L (ref 8–16)
AST SERPL-CCNC: 19 U/L (ref 10–40)
BASOPHILS # BLD AUTO: 0.01 K/UL (ref 0–0.2)
BASOPHILS NFR BLD: 0.2 % (ref 0–1.9)
BILIRUB SERPL-MCNC: 1.3 MG/DL (ref 0.1–1)
BILIRUB SERPL-MCNC: ABNORMAL MG/DL
BLOOD URINE, POC: ABNORMAL
BUN SERPL-MCNC: 50 MG/DL (ref 6–20)
CALCIUM SERPL-MCNC: 9.6 MG/DL (ref 8.7–10.5)
CERULOPLASMIN SERPL-MCNC: 28 MG/DL (ref 15–45)
CHLORIDE SERPL-SCNC: 98 MMOL/L (ref 95–110)
CK SERPL-CCNC: 36 U/L (ref 20–180)
CLARITY, POC UA: CLEAR
CO2 SERPL-SCNC: 27 MMOL/L (ref 23–29)
COLOR, POC UA: YELLOW
CREAT SERPL-MCNC: 8.2 MG/DL (ref 0.5–1.4)
DIFFERENTIAL METHOD: ABNORMAL
EOSINOPHIL # BLD AUTO: 0.2 K/UL (ref 0–0.5)
EOSINOPHIL NFR BLD: 4.9 % (ref 0–8)
ERYTHROCYTE [DISTWIDTH] IN BLOOD BY AUTOMATED COUNT: 15.4 % (ref 11.5–14.5)
EST. GFR  (NO RACE VARIABLE): 5.2 ML/MIN/1.73 M^2
FERRITIN SERPL-MCNC: 1646 NG/ML (ref 20–300)
GLUCOSE SERPL-MCNC: 75 MG/DL (ref 70–110)
GLUCOSE UR QL STRIP: NORMAL
HAV IGG SER QL IA: REACTIVE
HCT VFR BLD AUTO: 31.7 % (ref 37–48.5)
HGB BLD-MCNC: 10.4 G/DL (ref 12–16)
IGG SERPL-MCNC: 3655 MG/DL (ref 650–1600)
IMM GRANULOCYTES # BLD AUTO: 0.01 K/UL (ref 0–0.04)
IMM GRANULOCYTES NFR BLD AUTO: 0.2 % (ref 0–0.5)
INR PPP: 1 (ref 0.8–1.2)
IRON SERPL-MCNC: 67 UG/DL (ref 30–160)
KETONES UR QL STRIP: ABNORMAL
LEUKOCYTE ESTERASE URINE, POC: ABNORMAL
LYMPHOCYTES # BLD AUTO: 1.1 K/UL (ref 1–4.8)
LYMPHOCYTES NFR BLD: 24.2 % (ref 18–48)
MCH RBC QN AUTO: 34.4 PG (ref 27–31)
MCHC RBC AUTO-ENTMCNC: 32.8 G/DL (ref 32–36)
MCV RBC AUTO: 105 FL (ref 82–98)
MONOCYTES # BLD AUTO: 0.3 K/UL (ref 0.3–1)
MONOCYTES NFR BLD: 5.3 % (ref 4–15)
NEUTROPHILS # BLD AUTO: 3.1 K/UL (ref 1.8–7.7)
NEUTROPHILS NFR BLD: 65.2 % (ref 38–73)
NITRITE, POC UA: ABNORMAL
NRBC BLD-RTO: 0 /100 WBC
PH, POC UA: 9
PLATELET # BLD AUTO: 171 K/UL (ref 150–450)
PMV BLD AUTO: 11 FL (ref 9.2–12.9)
POTASSIUM SERPL-SCNC: 3.5 MMOL/L (ref 3.5–5.1)
PROT SERPL-MCNC: 9.6 G/DL (ref 6–8.4)
PROTEIN, POC: ABNORMAL
PROTHROMBIN TIME: 10.8 SEC (ref 9–12.5)
RBC # BLD AUTO: 3.02 M/UL (ref 4–5.4)
SATURATED IRON: 33 % (ref 20–50)
SODIUM SERPL-SCNC: 137 MMOL/L (ref 136–145)
SPECIFIC GRAVITY, POC UA: 1
TOTAL IRON BINDING CAPACITY: 206 UG/DL (ref 250–450)
TRANSFERRIN SERPL-MCNC: 139 MG/DL (ref 200–375)
UROBILINOGEN, POC UA: NORMAL
WBC # BLD AUTO: 4.72 K/UL (ref 3.9–12.7)

## 2022-10-24 PROCEDURE — 91200 FIBROSCAN NEW ORLEANS: ICD-10-PCS | Mod: 26,S$PBB,TXP, | Performed by: NURSE PRACTITIONER

## 2022-10-24 PROCEDURE — 86381 MITOCHONDRIAL ANTIBODY EACH: CPT | Mod: TXP | Performed by: NURSE PRACTITIONER

## 2022-10-24 PROCEDURE — 86790 VIRUS ANTIBODY NOS: CPT | Mod: TXP | Performed by: NURSE PRACTITIONER

## 2022-10-24 PROCEDURE — 88175 CYTOPATH C/V AUTO FLUID REDO: CPT | Performed by: OBSTETRICS & GYNECOLOGY

## 2022-10-24 PROCEDURE — 85025 COMPLETE CBC W/AUTO DIFF WBC: CPT | Mod: TXP | Performed by: NURSE PRACTITIONER

## 2022-10-24 PROCEDURE — 84466 ASSAY OF TRANSFERRIN: CPT | Mod: TXP | Performed by: NURSE PRACTITIONER

## 2022-10-24 PROCEDURE — 85610 PROTHROMBIN TIME: CPT | Mod: TXP | Performed by: NURSE PRACTITIONER

## 2022-10-24 PROCEDURE — 99203 OFFICE O/P NEW LOW 30 MIN: CPT | Mod: S$PBB,TXP,, | Performed by: INTERNAL MEDICINE

## 2022-10-24 PROCEDURE — 99999 PR PBB SHADOW E&M-EST. PATIENT-LVL IV: ICD-10-PCS | Mod: PBBFAC,TXP,, | Performed by: INTERNAL MEDICINE

## 2022-10-24 PROCEDURE — 80321 ALCOHOLS BIOMARKERS 1OR 2: CPT | Mod: TXP | Performed by: NURSE PRACTITIONER

## 2022-10-24 PROCEDURE — 86038 ANTINUCLEAR ANTIBODIES: CPT | Mod: TXP | Performed by: NURSE PRACTITIONER

## 2022-10-24 PROCEDURE — 99999 PR PBB SHADOW E&M-EST. PATIENT-LVL V: ICD-10-PCS | Mod: PBBFAC,TXP,, | Performed by: NURSE PRACTITIONER

## 2022-10-24 PROCEDURE — 86235 NUCLEAR ANTIGEN ANTIBODY: CPT | Mod: 59,TXP | Performed by: NURSE PRACTITIONER

## 2022-10-24 PROCEDURE — 99214 OFFICE O/P EST MOD 30 MIN: CPT | Mod: PBBFAC | Performed by: OBSTETRICS & GYNECOLOGY

## 2022-10-24 PROCEDURE — 82105 ALPHA-FETOPROTEIN SERUM: CPT | Mod: TXP | Performed by: NURSE PRACTITIONER

## 2022-10-24 PROCEDURE — 86039 ANTINUCLEAR ANTIBODIES (ANA): CPT | Mod: TXP | Performed by: NURSE PRACTITIONER

## 2022-10-24 PROCEDURE — 81002 URINALYSIS NONAUTO W/O SCOPE: CPT | Mod: PBBFAC,TXP | Performed by: OBSTETRICS & GYNECOLOGY

## 2022-10-24 PROCEDURE — G0101 PR CA SCREEN;PELVIC/BREAST EXAM: ICD-10-PCS | Mod: GZ,S$PBB,, | Performed by: OBSTETRICS & GYNECOLOGY

## 2022-10-24 PROCEDURE — G0101 CA SCREEN;PELVIC/BREAST EXAM: HCPCS | Mod: GZ,S$PBB,, | Performed by: OBSTETRICS & GYNECOLOGY

## 2022-10-24 PROCEDURE — 36415 COLL VENOUS BLD VENIPUNCTURE: CPT | Mod: TXP | Performed by: NURSE PRACTITIONER

## 2022-10-24 PROCEDURE — 80053 COMPREHEN METABOLIC PANEL: CPT | Mod: TXP | Performed by: NURSE PRACTITIONER

## 2022-10-24 PROCEDURE — 82784 ASSAY IGA/IGD/IGG/IGM EACH: CPT | Mod: TXP | Performed by: NURSE PRACTITIONER

## 2022-10-24 PROCEDURE — 99999 PR PBB SHADOW E&M-EST. PATIENT-LVL IV: CPT | Mod: PBBFAC,TXP,, | Performed by: INTERNAL MEDICINE

## 2022-10-24 PROCEDURE — 99214 OFFICE O/P EST MOD 30 MIN: CPT | Mod: PBBFAC,25,27,TXP | Performed by: INTERNAL MEDICINE

## 2022-10-24 PROCEDURE — 99203 PR OFFICE/OUTPT VISIT, NEW, LEVL III, 30-44 MIN: ICD-10-PCS | Mod: S$PBB,TXP,, | Performed by: INTERNAL MEDICINE

## 2022-10-24 PROCEDURE — 87624 HPV HI-RISK TYP POOLED RSLT: CPT | Mod: TXP | Performed by: OBSTETRICS & GYNECOLOGY

## 2022-10-24 PROCEDURE — 82550 ASSAY OF CK (CPK): CPT | Mod: TXP | Performed by: NURSE PRACTITIONER

## 2022-10-24 PROCEDURE — 99999 PR PBB SHADOW E&M-EST. PATIENT-LVL V: CPT | Mod: PBBFAC,TXP,, | Performed by: NURSE PRACTITIONER

## 2022-10-24 PROCEDURE — 91200 LIVER ELASTOGRAPHY: CPT | Mod: PBBFAC,TXP | Performed by: NURSE PRACTITIONER

## 2022-10-24 PROCEDURE — 99999 PR PBB SHADOW E&M-EST. PATIENT-LVL IV: ICD-10-PCS | Mod: PBBFAC,,, | Performed by: OBSTETRICS & GYNECOLOGY

## 2022-10-24 PROCEDURE — 82728 ASSAY OF FERRITIN: CPT | Mod: TXP | Performed by: NURSE PRACTITIONER

## 2022-10-24 PROCEDURE — 99215 OFFICE O/P EST HI 40 MIN: CPT | Mod: PBBFAC,27,TXP | Performed by: NURSE PRACTITIONER

## 2022-10-24 PROCEDURE — 99204 PR OFFICE/OUTPT VISIT, NEW, LEVL IV, 45-59 MIN: ICD-10-PCS | Mod: S$PBB,TXP,, | Performed by: NURSE PRACTITIONER

## 2022-10-24 PROCEDURE — 86256 FLUORESCENT ANTIBODY TITER: CPT | Mod: TXP | Performed by: NURSE PRACTITIONER

## 2022-10-24 PROCEDURE — 99999 PR PBB SHADOW E&M-EST. PATIENT-LVL IV: CPT | Mod: PBBFAC,,, | Performed by: OBSTETRICS & GYNECOLOGY

## 2022-10-24 PROCEDURE — 91200 LIVER ELASTOGRAPHY: CPT | Mod: 26,S$PBB,TXP, | Performed by: NURSE PRACTITIONER

## 2022-10-24 PROCEDURE — 82390 ASSAY OF CERULOPLASMIN: CPT | Mod: TXP | Performed by: NURSE PRACTITIONER

## 2022-10-24 PROCEDURE — G0101 CA SCREEN;PELVIC/BREAST EXAM: HCPCS | Mod: PBBFAC,TXP | Performed by: OBSTETRICS & GYNECOLOGY

## 2022-10-24 PROCEDURE — 99204 OFFICE O/P NEW MOD 45 MIN: CPT | Mod: S$PBB,TXP,, | Performed by: NURSE PRACTITIONER

## 2022-10-24 RX ORDER — CHLORTHALIDONE 25 MG/1
TABLET ORAL
COMMUNITY

## 2022-10-24 RX ORDER — FOLIC ACID 1 MG/1
TABLET ORAL
COMMUNITY

## 2022-10-24 RX ORDER — GABAPENTIN 100 MG/1
2 CAPSULE ORAL
COMMUNITY

## 2022-10-24 RX ORDER — PANTOPRAZOLE SODIUM 40 MG/1
40 FOR SUSPENSION ORAL
COMMUNITY
End: 2022-11-03 | Stop reason: SDUPTHER

## 2022-10-24 RX ORDER — DILTIAZEM HYDROCHLORIDE 240 MG/1
1 CAPSULE, EXTENDED RELEASE ORAL
COMMUNITY

## 2022-10-24 NOTE — PROGRESS NOTES
SUBJECTIVE:     Chief Complaint: Well Woman       History of Present Illness:  Annual Exam  Patient presents for annual exam.   She c/o occasional pelvic pain (none today) and urinary frequency today.  LMP:   She denies any vd, vb, dyspareunia, dysuria, depression, anxiety.  Last pap was in 2022 and was neg per patient in [place]. HPV neg per patient. Has records with her today.  Birth Control:   declines STD testing.   Trying to get on kidney transplant list. [place]     GYN screening history:  denies abnl paps  Mammogram history: denies abnl mammos. Has records with her today. Neg 3/2022.  Colonoscopy history: neg per patient last year in [place].   Dexa history: none    FH:   Breast cancer: none  Colon cancer: none  Ovarian cancer: none    Review of patient's allergies indicates:   Allergen Reactions    Amoxicillin        Past Medical History:   Diagnosis Date    Diabetes mellitus     Hypertension     Kidney disease      History reviewed. No pertinent surgical history.  OB History    No obstetric history on file.       Family History   Problem Relation Age of Onset    Diabetes Mother     Cancer Mother     Hypertension Mother     Stroke Mother     Diabetes Father     Hypertension Father     No Known Problems Brother      Social History     Tobacco Use    Smoking status: Never    Smokeless tobacco: Never   Substance Use Topics    Alcohol use: Not Currently    Drug use: Never       Current Outpatient Medications   Medication Sig    calcium-vitamin D3 (OS- + D3) 500 mg-5 mcg (200 unit) per tablet Take 1 tablet by mouth once daily.    carvediloL (COREG) 3.125 MG tablet Take 3.125 mg by mouth.    hydrALAZINE (APRESOLINE) 10 MG tablet Take 10 mg by mouth every 12 (twelve) hours.    sucroferric oxyhydroxide (VELPHORO) 500 mg Chew Take 500 mg by mouth 3 (three) times daily.    brimonidine 0.2% (ALPHAGAN) 0.2 % Drop Apply 1 drop to eye.    dorzolamide-timolol 2-0.5% (COSOPT) 22.3-6.8 mg/mL  ophthalmic solution Apply 1 drop to eye 2 (two) times daily.    latanoprost 0.005 % ophthalmic solution Apply 1 drop to eye nightly.     No current facility-administered medications for this visit.       Review of Systems:  GENERAL: No fever, chills, fatigability or weight loss.  CARDIOVASCULAR: No chest pain. No palpitations.  RESPIRATORY: No SOB, no wheezing.  BREAST: Denies pain. No lumps. No discharge.  VULVAR: No pain, no lesions and no itching.  VAGINAL: No relaxation, no itching, no discharge, no abnormal bleeding and no lesions.  ABDOMEN: occasional abdominal pain. Denies nausea. Denies vomiting. No diarrhea. No constipation  URINARY: No incontinence, no nocturia, + frequency and no dysuria.  NEUROLOGICAL: No headaches. No vision changes.       OBJECTIVE:     Vitals:    10/24/22 1050   BP: (!) 160/90       Patient verbally consents to pelvic and breast exams.  Physical Exam:  Gen: NAD, well developed, well-nourished  HEENT: Normocephalic, atraumatic  Eyes: EOM nl, conjuntivae normal  Neck: ROM normal, no thyromegaly  Respiratory: Effort normal   Abd: soft, nontender, no masses palpated  Breast: Normal bilaterally, no masses, lesions or tenderness. No nipple discharge on expression, no lymphadenopathy bilaterally.  SSE:  Vulva: no lesions or rashes  Cervix: No lesions noted, nonfriable, no vaginal discharge or vaginal bleeding noted  BME:   Cervix: No CMT  Adnexa: nl bilaterally, no masses or fullness palpated  Uterus: normal, nonenlarged  Musculoskeletal: normal ROM  Neuro: alert, AAOx3  Skin: warm and dry  Psych: mood/affect nl, behavior normal, judgement normal, thought content normal        ASSESSMENT:       ICD-10-CM ICD-9-CM    1. Encounter for routine gynecologic examination in Medicare patient  Z01.419 V72.31 Liquid-Based Pap Smear, Screening      HPV High Risk Genotypes, PCR      2. Breast cancer screening by mammogram  Z12.31 V76.12 CANCELED: Mammo Digital Screening Bilat      3. Urinary frequency   R35.0 788.41 POCT URINE DIPSTICK WITHOUT MICROSCOPE             Plan:      Caron was seen today for well woman.    Diagnoses and all orders for this visit:    Encounter for routine gynecologic examination in Medicare patient  -     Liquid-Based Pap Smear, Screening  -     HPV High Risk Genotypes, PCR    Breast cancer screening by mammogram  -     Cancel: Mammo Digital Screening Bilat; Future    Urinary frequency  -     POCT URINE DIPSTICK WITHOUT MICROSCOPE      Orders Placed This Encounter   Procedures    HPV High Risk Genotypes, PCR    POCT URINE DIPSTICK WITHOUT MICROSCOPE     -pap repeated to have on file for transplant check off although records show recently negative.   Follow up in one year for annual, or prn.    Julie R Jeansonne

## 2022-10-24 NOTE — PATIENT INSTRUCTIONS
Because you have cirrhosis, it is important to attend clinic visits every 6 months with an Ultrasound and blood tests every 6 months to screen for liver cancer (you are at risk of developing liver cancer due to scar tissue in the liver)    Signs and symptoms of worsening liver disease include jaundice, fluid in the belly (ascites), and confusion/disorientation/slowed thought processes due to hepatic encephalopathy (toxins building up because of liver problems).   You should seek medical attention if any of these things occur.    Also, possible bleeding from esophageal varices (blood vessels in the stomach and foodpipe can burst and cause fatal bleeding).  Therefore, if you have symptoms of vomiting blood, blood in your stool, dark or black stools or vomiting coffee ground vomit, YOU SHOULD GO TO THE EMERGENCY ROOM IMMEDIATELY.     Cirrhosis can increase the risk of liver cancer, liver failure, and death. However, we will watch your liver function score (MELD score) closely with each clinic visit. A normal MELD score is 6, highest is 40. We will check this with every clinic visit. A MELD 15 or higher is when we start to consider transplant because MELD 15 or higher indicates that the liver is not functioning as well     Cirrhosis Counseling  - NO alcohol use (includes beer, wine, and/or liquor)  - avoid non-steroidal anti-inflammatory drugs (NSAIDs) such as ibuprofen, Motrin, naprosyn, Alleve due to the risk of kidney damage  - can take acetaminophen (Tylenol), no more than 2000 mg per day  - low sodium (salt) 2 gram per day diet  - high protein diet: 80 grams per day to prevent muscle mass loss. Drink at least 1 protein shake daily (Premier Protein is best option because it is very high protein and low sugar). Ok to use this as nighttime snack to fit it in   - resistance exercises for muscle strength  - avoid raw seafoods due to the risk of fatal Vibrio vulnificus infection  - ultrasound of the liver every 6 months  for liver cancer screening (you are at risk of developing liver cancer due to scar tissue in the liver)  - Upper endoscopy every 1-2 years to screen for varices in the stomach and foodpipe which can burst and cause fatal bleeding

## 2022-10-24 NOTE — PROGRESS NOTES
[facility] VISIT NEW PT NOTE    REFERRING PROVIDER:  Milvia Husain    CHIEF COMPLAINT: Abnormal diagnostic imaging    HPI: This is a 57 y.o. Other female with PMH noted below, presenting for evaluation of abnormal diagnostic imaging of the liver.     The patient's risk factors for NAFLD/PEARL include:     Obesity                                        Yes; BMI 32.99.  Dyslipidemia                                No; Last lipid panel drawn in 3/2022.  Insulin resistance/Diabetes         Yes; History of DM, but not currently on treatment (diet controlled)- last HgbA1c was 4.7% (3/2022)  Family history of diabetes           Yes; Mother and Father with history of DM.    She has a history of ESRD, due to diabetic nephropathy, and is currently undergoing evaluation for kidney transplant. She underwent CT in 12/2021, and again in 6/2022 which showed a nodular liver contour, suggestive of cirrhosis, with no focal liver lesions or ascites. Most recent LFT's in 6/2022 showed a T. Bili of 0.6, Albumin of 3.3, ALP of 132, AST of 27, ALT of 17. She also has a history of thrombocytopenia (80's - 130's). She has never undergone a liver biopsy. Most recent MELD-Na was 21, based on labs drawn in 3/2022 (primarily driven by renal function).   Recent labs show that she is immune to Hepatitis B, through prior vaccination. HCV and HIV negative. She has no known family history of liver disease. She does not drink alcohol heavily or use illicit drugs.   Fibroscan today to stage her liver disease is suggestive of severe fatty infiltration of the liver (S3), with F4 fibrosis, and a high likelihood of cirrhosis. She denies any signs or symptoms of hepatic decompensation including jaundice, dark urine, abdominal distention, lower extremity edema, hematemesis, melena, or periods of confusion suggestive of hepatic encephalopathy.     Review of patient's allergies indicates:   Allergen Reactions    Amoxicillin      Current  Outpatient Medications on File Prior to Visit   Medication Sig Dispense Refill    calcium-vitamin D3 (OS- + D3) 500 mg-5 mcg (200 unit) per tablet Take 1 tablet by mouth once daily.      carvediloL (COREG) 3.125 MG tablet Take 3.125 mg by mouth.      dorzolamide-timolol 2-0.5% (COSOPT) 22.3-6.8 mg/mL ophthalmic solution Apply 1 drop to eye 2 (two) times daily.      hydrALAZINE (APRESOLINE) 10 MG tablet Take 10 mg by mouth every 12 (twelve) hours.      latanoprost 0.005 % ophthalmic solution Apply 1 drop to eye nightly.      sucroferric oxyhydroxide (VELPHORO) 500 mg Chew Take 500 mg by mouth 3 (three) times daily.      brimonidine 0.2% (ALPHAGAN) 0.2 % Drop Apply 1 drop to eye.      chlorthalidone (HYGROTEN) 25 MG Tab Take by mouth.      diltiaZEM (TIAZAC) 240 MG Cs24 Take 1 capsule by mouth.      folic acid (FOLVITE) 1 MG tablet Take by mouth.      gabapentin (NEURONTIN) 100 MG capsule Take 2 capsules by mouth.      pantoprazole (PROTONIX) 40 mg suspension 40 mg.       No current facility-administered medications on file prior to visit.     PMHX:  has a past medical history of Diabetes mellitus, Fatty liver disease, nonalcoholic, Hypertension, Kidney disease, and Unspecified cirrhosis of liver.    PSHX:  has no past surgical history on file.    FAMILY HISTORY: Negative for liver disease, reviewed in [facility]    SOCIAL HISTORY:   Social History     Tobacco Use   Smoking Status Never   Smokeless Tobacco Never       Social History     Substance and Sexual Activity   Alcohol Use Not Currently       Social History     Substance and Sexual Activity   Drug Use Never       ROS:   GENERAL: Denies fever, chills, weight loss/gain, fatigue  HEENT: Denies headaches, dizziness, vision/hearing changes  CARDIOVASCULAR: Denies chest pain, palpitations, or edema  RESPIRATORY: Denies dyspnea, cough  GI: Denies abdominal pain, rectal bleeding, nausea, vomiting. No change in bowel pattern or color  : Denies dysuria, hematuria  "  SKIN: Denies rash, itching   NEURO: Denies confusion, memory loss, or mood changes  PSYCH: Denies depression or anxiety  HEME/LYMPH: Denies easy bruising or bleeding    PHYSICAL EXAM:   Friendly Other female, in no acute distress; alert and oriented to person, place and time.  VITALS: /80 (BP Location: Right arm, Patient Position: Sitting, BP Method: Medium (Automatic))   Pulse 77   Temp 98.6 °F (37 °C) (Oral)   Resp 18   Ht 5' 1" (1.549 m)   Wt 79.1 kg (174 lb 6.1 oz)   SpO2 (!) 93%   BMI 32.95 kg/m²   HENT: Normocephalic, without obvious abnormality.   EYES: Sclerae anicteric. No conjunctival pallor.   NECK: Supple. No masses or cervical adenopathy.  CARDIOVASCULAR: Regular rate and rhythm. No murmurs.  RESPIRATORY: Normal respiratory effort. BBS CTA. No wheezes or crackles.  GI: Soft, non-tender, non-distended. No masses palpable. No ascites.  EXTREMITIES:  No clubbing, cyanosis or edema.  SKIN: Warm and dry. No jaundice. No rashes noted to exposed skin. No telangectasias noted. No palmar erythema.  NEURO:  Normal gait. No asterixis.  PSYCH:  Memory intact. Thought and speech pattern appropriate. Behavior normal. No depression or anxiety noted.    DIAGNOSTIC STUDIES:    CT ABDOMEN PELVIS WITHOUT CONTRAST 12/10/2021:    FINDINGS:   There is pulmonary infiltrate in the bilateral lung   bases.  A catheter is noted extending from the inferior vena cava   into right atrium.  There is marked nodularity of the liver   consistent with cirrhosis.  There are stones present in the   gallbladder with no inflammatory changes.  The spleen is mildly   prominent.  The pancreas, bilateral adrenal glands and right kidney   are normal.  There is a 4 mm nonobstructing stone in the left kidney   with no hydronephrosis.   The urinary bladder is decompressed and appears normal.  There is   fluid within the endocervical canal which is abnormal for the   patient's age.  The adnexa are normal in appearance.  A normal "   appendix is present in visualized.  There are scattered diverticula   of the distal colon with no associated inflammatory changes.  There   is no free fluid or free air.       IMPRESSION:   1. Pulmonary infiltrates in the bilateral lung bases consistent with   pneumonia.   2. Severe cirrhosis.   3. Cholelithiasis.   4. Scattered diverticulosis with no associated inflammation.   5. Fluid in the endocervical canal which is abnormal for the   patient's age.  Further evaluation with ultrasound would be useful   when clinically feasible.   6. Nonobstructing nephrolithiasis of the left kidney.    CT ABDOMEN PELVIS WITHOUT CONTRAST 6/11/2022:    FINDINGS:        CT abdomen: Nodular liver contour again evident   suggesting cirrhosis, without focal liver lesion otherwise.   Cholelithiasis noted.  The spleen, pancreas, adrenal glands, and   kidneys are stable without significant focal abnormality.  Small   renal cyst noted.  No retroperitoneal adenopathy or lower quadrant   abnormality.   CT pelvis: No mass, adenopathy, or abnormal fluid collection.     IMPRESSION:   1. No acute findings   2. Incidental findings include evidence of cirrhosis as well as   cholelithiasis.   3. No significant adverse interval changes    FIBROSCAN 10/24/2022:    Findings  Median liver stiffness score:  24  CAP Reading: dB/m:  291     IQR/med %:  15  Interpretation  Fibrosis interpretation is based on medial liver stiffness - Kilopascal (kPa).     Fibrosis Stage:  F4  Steatosis interpretation is based on controlled attenuation parameter - (dB/m).     Steatosis Grade:  S3    EDUCATION:  The disease process and manifestations of cirrhosis were discussed.    Discussed implications of a cirrhosis diagnosis with HCC screenings and EGD and why it is important for us to properly monitor for potential complications.     Signs and symptoms of hepatic decompensation were reviewed, including jaundice, ascites, and slowed mentation due to hepatic  encephalopathy. The patient should seek medical attention if any of these things occur.  We discussed the potential for bleeding from esophageal varices with symptoms of hematemesis and melena. The patient should report to the Emergency Department for these symptoms.    We discussed the increased risk of hepatocellular carcinoma due to cirrhosis. Continued screening every six months with ultrasound and AFP is recommended, and was discussed with the patient.     Cirrhosis Counseling  - Strict abstinence of alcohol use (includes beer, wine, and/or liquor).  - avoid non-steroidal anti-inflammatory drugs (NSAIDs) such as ibuprofen, Motrin, naprosyn, Aleve due to the risk of kidney damage.  - Okay to take acetaminophen (Tylenol), no more than 2,000 mg per day.  - low sodium (salt) 2 gram per day diet.  - high protein diet: 80 grams per day to prevent muscle mass loss. Recommended at least 1 protein shake daily using Premier Protein shakes.  - resistance exercises for muscle strength  - Avoid raw seafoods due to the risk of fatal Vibrio vulnificus infection.  - Recommend an Ultrasound of the liver every 6 months for liver cancer screening.  - Recommend an Upper endoscopy every 1-2 years to screen for varices in the stomach and esophagus    ASSESSMENT & PLAN:  57 y.o. Other female with:    1. Organ transplant candidate  Ambulatory referral/consult to Hepatology    AFP Tumor Marker    Comprehensive Metabolic Panel    CBC Auto Differential    Protime-INR    Phosphatidylethanol (PETH)    US Abdomen Complete    Hepatitis A antibody, IgG    Alpha-1-Antitrypsin    KIERAN Screen w/Reflex    Antimitochondrial Antibody    Anti-Smooth Muscle Antibody    Ceruloplasmin    Iron and TIBC    Ferritin    CK    IgG    Case Request Endoscopy: EGD (ESOPHAGOGASTRODUODENOSCOPY)      2. Abnormal findings on diagnostic imaging of liver  AFP Tumor Marker    Comprehensive Metabolic Panel    CBC Auto Differential    Protime-INR    Phosphatidylethanol  (PETH)    US Abdomen Complete    Hepatitis A antibody, IgG    Alpha-1-Antitrypsin    KIERAN Screen w/Reflex    Antimitochondrial Antibody    Anti-Smooth Muscle Antibody    Ceruloplasmin    Iron and TIBC    Ferritin    CK    IgG    Case Request Endoscopy: EGD (ESOPHAGOGASTRODUODENOSCOPY)      3. Cirrhosis of liver without ascites, unspecified hepatic cirrhosis type  Case Request Endoscopy: EGD (ESOPHAGOGASTRODUODENOSCOPY)      4. Fatty liver disease, nonalcoholic  Case Request Endoscopy: EGD (ESOPHAGOGASTRODUODENOSCOPY)      5. Thrombocytopenia  Alpha-1-Antitrypsin    KIERAN Screen w/Reflex    Antimitochondrial Antibody    Anti-Smooth Muscle Antibody    Ceruloplasmin    Iron and TIBC    Ferritin    CK    IgG    Case Request Endoscopy: EGD (ESOPHAGOGASTRODUODENOSCOPY)      6. Abnormal results of liver function studies  Protime-INR    Alpha-1-Antitrypsin    KIERAN Screen w/Reflex    Antimitochondrial Antibody    Anti-Smooth Muscle Antibody    Ceruloplasmin    Iron and TIBC    Ferritin    CK    IgG    Case Request Endoscopy: EGD (ESOPHAGOGASTRODUODENOSCOPY)        MELD-Na score: 21 at 3/3/2022  8:11 AM  MELD score: 20 at 3/3/2022  8:11 AM  Calculated from:  Serum Creatinine: On dialysis. Using 4 mg/dL.  Serum Sodium: 136 mmol/L at 3/3/2022  8:11 AM  Total Bilirubin: 0.8 mg/dL (Using min of 1 mg/dL) at 3/3/2022  8:11 AM  INR(ratio): 1.0 at 3/3/2022  8:11 AM  Age: 57 years    - Obtain labs today to monitor liver function and screen for other causes of chronic liver disease.  - Recommend an Ultrasound of the liver, with AFP measurement every 6 months for HCC surveillance, next due 12/2022.  - Recommend EGD now for variceal surveillance.  - Avoid alcohol and herbal supplements/alternative remedies.  - Okay to take acetaminophen (Tylenol), no more than 2,000 mg per day for pain, fever and headache.  - Avoid raw seafood and shellfish, due to risk of fatal Vibrio vulnificus infection.  - Return to clinic in 1-2 months.     Thank you  for allowing me to participate in the care of Caron Alvin       Hepatology Nurse Practitioner  [facility]  10/24/2022 @ 1500    CC'ed note to:   Milvia Husain, STUART  Primary Doctor No

## 2022-10-24 NOTE — PROCEDURES
FibroScan [place]    Date/Time: 10/24/2022 2:00 PM  Performed by: Francheska Miranda NP  Authorized by: Francheska Miranda NP     Diagnosis:  Translant    Probe:  XL    Universal Protocol: Patient's identity, procedure and site were verified, confirmatory pause was performed.  Discussed procedure including risks and potential complications.  Questions answered.  Patient verbalizes understanding and wishes to proceed with VCTE.     Procedure: After providing explanations of the procedure, patient was placed in the supine position with right arm in maximum abduction to allow optimal exposure of right lateral abdomen.  Patient was briefly assessed, Testing was performed in the mid-axillary location, 50Hz Shear Wave pulses were applied and the resulting Shear Wave and Propagation Speed detected with a 3.5 MHz ultrasonic signal, using the FibroScan probe, Skin to liver capsule distance and liver parenchyma were accessed during the entire examination with the FibroScan probe, Patient was instructed to breathe normally and to abstain from sudden movements during the procedure, allowing for random measurements of liver stiffness. At least 10 Shear Waves were produced, Individual measurements of each Shear Wave were calculated.  Patient tolerated the procedure well with no complications.  Meets discharge criteria as was dismissed.  Rates pain 0 out of 10.  Patient will follow up with ordering provider to review results.    Findings  Median liver stiffness score:  24  CAP Reading: dB/m:  291    IQR/med %:  15  Interpretation  Fibrosis interpretation is based on medial liver stiffness - Kilopascal (kPa).    Fibrosis Stage:  F4  Steatosis interpretation is based on controlled attenuation parameter - (dB/m).    Steatosis Grade:  S3

## 2022-10-25 LAB
ANA PATTERN 1: NORMAL
ANA PATTERN 2: NORMAL
ANA SER QL IF: POSITIVE
ANA TITER 2: NORMAL
ANA TITR SER IF: NORMAL {TITER}
MITOCHONDRIA AB TITR SER IF: NORMAL {TITER}
SMOOTH MUSCLE AB TITR SER IF: ABNORMAL {TITER}

## 2022-10-26 ENCOUNTER — TELEPHONE (OUTPATIENT)
Dept: HEPATOLOGY | Facility: CLINIC | Age: 57
End: 2022-10-26
Payer: MEDICARE

## 2022-10-26 LAB
ANTI SM ANTIBODY: 0.13 RATIO (ref 0–0.99)
ANTI SM/RNP ANTIBODY: 0.11 RATIO (ref 0–0.99)
ANTI-SM INTERPRETATION: NEGATIVE
ANTI-SM/RNP INTERPRETATION: NEGATIVE
ANTI-SSA ANTIBODY: 5.56 RATIO (ref 0–0.99)
ANTI-SSA INTERPRETATION: POSITIVE
ANTI-SSB ANTIBODY: 2.11 RATIO (ref 0–0.99)
ANTI-SSB INTERPRETATION: POSITIVE
DSDNA AB SER-ACNC: ABNORMAL [IU]/ML

## 2022-10-26 NOTE — TELEPHONE ENCOUNTER
----- Message from Francheska Miranda NP sent at 10/26/2022 11:59 AM CDT -----  Will discuss lab results further at upcoming appointment - please contact patient to arrange a sooner follow up visit with me (virtual visit okay). Thanks!

## 2022-10-26 NOTE — PROGRESS NOTES
[facility]ology Clinic at [facility]     Chief Complaint:   Encounter Diagnosis   Name Primary?    Organ transplant candidate            HPI:  Caron Esquivel is a 57 y.o. female who presents today for evaluation of abnormal spep.     Hematology History  Patient is a candidate for kidney transplant. She is currently undergoing work up to be placed on transplant list  As part of transplant work up, patient had an abnormal SPEP and FLC panel  Denies any B symptoms      Social History     Tobacco Use    Smoking status: Never    Smokeless tobacco: Never   Substance Use Topics    Alcohol use: Not Currently    Drug use: Never     Family History   Problem Relation Age of Onset    Diabetes Mother     Cancer Mother     Hypertension Mother     Stroke Mother     Diabetes Father     Hypertension Father     No Known Problems Brother      Past Medical History:   Diagnosis Date    Diabetes mellitus     Fatty liver disease, nonalcoholic     Hypertension     Kidney disease     Unspecified cirrhosis of liver      No past surgical history on file.    Patient Active Problem List   Diagnosis    Closed 2-part displaced fracture of surgical neck of left humerus    Pre-transplant evaluation for kidney transplant    ESRD (end stage renal disease)    Essential hypertension    History of TIA (transient ischemic attack)    Organ transplant candidate    Abnormal findings on diagnostic imaging of liver    Abnormal results of liver function studies    Thrombocytopenia    Abnormal liver CT    Fatty liver disease, nonalcoholic    Cirrhosis of liver without ascites       Current Outpatient Medications   Medication Instructions    brimonidine 0.2% (ALPHAGAN) 0.2 % Drop 1 drop, Ophthalmic    calcium-vitamin D3 (OS- + D3) 500 mg-5 mcg (200 unit) per tablet 1 tablet, Oral, Daily    carvediloL (COREG) 3.125 mg, Oral    chlorthalidone (HYGROTEN) 25 MG Tab Oral    diltiaZEM (TIAZAC) 240 MG Cs24 1 capsule, Oral     "dorzolamide-timolol 2-0.5% (COSOPT) 22.3-6.8 mg/mL ophthalmic solution 1 drop, Ophthalmic, 2 times daily    folic acid (FOLVITE) 1 MG tablet Oral    gabapentin (NEURONTIN) 100 MG capsule 2 capsules, Oral    hydrALAZINE (APRESOLINE) 10 mg, Oral, Every 12 hours    latanoprost 0.005 % ophthalmic solution 1 drop, Ophthalmic, Nightly    pantoprazole (PROTONIX) 40 mg    VELPHORO 500 mg, Oral, 3 times daily       Review of Systems:   Review of Systems   Constitutional: Negative.    HENT: Negative.     Respiratory: Negative.     Cardiovascular: Negative.    Gastrointestinal: Negative.    Musculoskeletal: Negative.    Neurological: Negative.    All other systems reviewed and are negative.    PHYSICAL EXAM:  BP (!) 214/89 (BP Location: Left arm, Patient Position: Sitting, BP Method: Large (Automatic))   Pulse 75   Temp 97.5 °F (36.4 °C) (Oral)   Resp 18   Ht 5' 1" (1.549 m)   Wt 79 kg (174 lb 2.6 oz)   SpO2 95%   BMI 32.91 kg/m²     General Appearance:    Alert, cooperative, no distress, appears stated age   Head:    Normocephalic, without obvious abnormality, atraumatic   Lungs:     Clear to auscultation bilaterally, respirations unlabored    Heart:    Regular rate and rhythm, S1 and S2 normal   Abdomen:     Soft, non-tender, bowel sounds active, no masses, no organomegaly   Extremities:   Extremities normal, atraumatic, no cyanosis or edema   Pulses:   2+ and symmetric all extremities   Skin:   Skin color, texture, turgor normal, no rashes or lesions   Lymph nodes:   Cervical, supraclavicular, and axillary nodes normal   Neurologic:   CNII-XII intact, normal strength, sensation and reflexes     throughout           Pertinent Labs & Imaging:  Recent Labs   Lab Result Units 10/24/22  1534   WBC K/uL 4.72   Hemoglobin g/dL 10.4*   Hematocrit % 31.7*   Platelets K/uL 171     Recent Labs   Lab Result Units 10/24/22  1534   Creatinine mg/dL 8.2*   AST U/L 19   ALT U/L 11     Recent Labs   Lab Result Units 10/24/22  1534 "   Iron ug/dL 67   Ferritin ng/mL 1,646*       Assessment & Plan:    1. Organ transplant candidate  - Ambulatory referral/consult to Hematology / Oncology    Full chart review of past labs and imaging, referring providers' notes, and consultants' reports.   Patient with SPEP showing increased gamma globulin, but these are polyclonal  FLC shows increased kappa light chains and lambda light chains but FLC ratio is normal at 1.01  Based on these findings, patient does not have MGUS or myeloma. These abnormalities are more indicative of inflammation  Continue to follow up with transplant nephrology. No hematology indication to delay transplant listing      Med Onc Chart Routing      Follow up with physician No follow up needed.   Follow up with MARGARET    Infusion scheduling note    Injection scheduling note    Labs    Imaging    Pharmacy appointment    Other referrals          Total time of this visit, including time spent face to face with patient and/or via video/audio, and also in preparing for today's visit for MDM and documentation. (Medical Decision Making, including consideration of possible diagnoses, management options, complex medical record review, review of diagnostic tests and information, consideration and discussion of significant complications based on comorbidities, and discussion with providers involved with the care of the patient) 30 minutes. Greater than 50% was spent face to face with the patient counseling and coordinating care.      Tabitha Alatorre MD  10/26/2022

## 2022-10-27 ENCOUNTER — TELEPHONE (OUTPATIENT)
Dept: HEPATOLOGY | Facility: CLINIC | Age: 57
End: 2022-10-27
Payer: MEDICARE

## 2022-10-27 NOTE — TELEPHONE ENCOUNTER
Spoke with patient. Patient would like to have appointment over the phone. I sent to her an activation code to set up [facility] account so she can do virtual visit. Scheduled virtual visit. Mailed appointment reminder to patient.

## 2022-10-28 LAB
CLINICAL BIOCHEMIST REVIEW: NORMAL
PETH 16:0/18.1 (POPETH): <10 NG/ML
PETH 16:0/18.2 (PLPETH): <10 NG/ML

## 2022-10-31 ENCOUNTER — TELEPHONE (OUTPATIENT)
Dept: HEPATOLOGY | Facility: CLINIC | Age: 57
End: 2022-10-31
Payer: MEDICARE

## 2022-10-31 NOTE — TELEPHONE ENCOUNTER
Returned call to patient. Patient stated that she was trying to set up a [facility] but her phone is not equip for anything online. She was asking if its okay for you to call her or send her  a letter? I told her to just leave her virtual appointment and we will see what your provider will do. I told her to schedule her future follow up appointment in [place] so she don't need to come down here.

## 2022-10-31 NOTE — TELEPHONE ENCOUNTER
----- Message from Nathaniel Singleton sent at 10/31/2022  1:46 PM CDT -----  Regarding: call back  Pt call to reschedule appt     Call

## 2022-11-01 LAB
FINAL PATHOLOGIC DIAGNOSIS: NORMAL
Lab: NORMAL

## 2022-11-02 ENCOUNTER — TELEPHONE (OUTPATIENT)
Dept: HEPATOLOGY | Facility: CLINIC | Age: 57
End: 2022-11-02
Payer: MEDICARE

## 2022-11-02 LAB
HPV HR 12 DNA SPEC QL NAA+PROBE: NEGATIVE
HPV16 AG SPEC QL: NEGATIVE
HPV18 DNA SPEC QL NAA+PROBE: NEGATIVE

## 2022-11-02 NOTE — TELEPHONE ENCOUNTER
Spoke with Florence, the pt's daughter and she was asking about her mom's appointment tomorrow. I told her that she is scheduled for virtual visit tomorrow and provider will just call her. Florence said thank you.

## 2022-11-02 NOTE — TELEPHONE ENCOUNTER
----- Message from Drake Cosme sent at 11/2/2022  3:33 PM CDT -----  Regarding: speak to staff  Patient's daughter, Florence, calling regarding inquiries. Requesting a call back.      Call: [phone]

## 2022-11-03 ENCOUNTER — OFFICE VISIT (OUTPATIENT)
Dept: HEPATOLOGY | Facility: CLINIC | Age: 57
End: 2022-11-03
Payer: MEDICARE

## 2022-11-03 ENCOUNTER — TELEPHONE (OUTPATIENT)
Dept: HEPATOLOGY | Facility: CLINIC | Age: 57
End: 2022-11-03

## 2022-11-03 DIAGNOSIS — R76.8 POSITIVE ANA (ANTINUCLEAR ANTIBODY): ICD-10-CM

## 2022-11-03 DIAGNOSIS — M06.9 RHEUMATOID ARTHRITIS, INVOLVING UNSPECIFIED SITE, UNSPECIFIED WHETHER RHEUMATOID FACTOR PRESENT: ICD-10-CM

## 2022-11-03 DIAGNOSIS — K76.0 FATTY LIVER DISEASE, NONALCOHOLIC: ICD-10-CM

## 2022-11-03 DIAGNOSIS — K74.60 CIRRHOSIS OF LIVER WITHOUT ASCITES, UNSPECIFIED HEPATIC CIRRHOSIS TYPE: Primary | ICD-10-CM

## 2022-11-03 DIAGNOSIS — Z76.82 ORGAN TRANSPLANT CANDIDATE: ICD-10-CM

## 2022-11-03 DIAGNOSIS — N18.6 ESRD (END STAGE RENAL DISEASE): ICD-10-CM

## 2022-11-03 PROBLEM — N25.81 SECONDARY HYPERPARATHYROIDISM OF RENAL ORIGIN: Status: ACTIVE | Noted: 2021-03-31

## 2022-11-03 PROBLEM — I95.9 HYPOTENSION, UNSPECIFIED: Status: ACTIVE | Noted: 2021-06-15

## 2022-11-03 PROBLEM — J96.01 ACUTE RESPIRATORY FAILURE WITH HYPOXIA: Status: ACTIVE | Noted: 2021-03-27

## 2022-11-03 PROBLEM — E11.39: Status: ACTIVE | Noted: 2019-03-26

## 2022-11-03 PROBLEM — E83.39 OTHER DISORDERS OF PHOSPHORUS METABOLISM: Status: ACTIVE | Noted: 2022-08-23

## 2022-11-03 PROBLEM — Z96.1 PSEUDOPHAKIA: Status: ACTIVE | Noted: 2019-10-07

## 2022-11-03 PROBLEM — E46 UNSPECIFIED PROTEIN-CALORIE MALNUTRITION: Status: ACTIVE | Noted: 2021-04-12

## 2022-11-03 PROBLEM — E55.9 VITAMIN D DEFICIENCY: Status: ACTIVE | Noted: 2022-11-03

## 2022-11-03 PROBLEM — H40.9 GLAUCOMA: Status: ACTIVE | Noted: 2022-11-03

## 2022-11-03 PROBLEM — E03.8 SUBCLINICAL HYPOTHYROIDISM: Status: ACTIVE | Noted: 2021-03-22

## 2022-11-03 PROBLEM — H43.13: Status: ACTIVE | Noted: 2019-03-26

## 2022-11-03 PROBLEM — I20.9 ANGINA PECTORIS, UNSPECIFIED: Status: ACTIVE | Noted: 2022-01-06

## 2022-11-03 PROBLEM — E11.3599 PDR (PROLIFERATIVE DIABETIC RETINOPATHY): Status: ACTIVE | Noted: 2019-07-05

## 2022-11-03 PROBLEM — M35.05 SJOGREN SYNDROME WITH INFLAMMATORY ARTHRITIS: Status: ACTIVE | Noted: 2022-11-03

## 2022-11-03 PROBLEM — Z79.899 OTHER LONG TERM (CURRENT) DRUG THERAPY: Status: ACTIVE | Noted: 2022-11-03

## 2022-11-03 PROBLEM — D63.1 ANEMIA IN CHRONIC KIDNEY DISEASE: Status: ACTIVE | Noted: 2021-03-31

## 2022-11-03 PROBLEM — R50.9 FEVER, UNSPECIFIED: Status: ACTIVE | Noted: 2021-03-31

## 2022-11-03 PROBLEM — M17.10 ARTHRITIS OF KNEE: Status: ACTIVE | Noted: 2022-11-03

## 2022-11-03 PROBLEM — T78.49XA OTHER ALLERGY, INITIAL ENCOUNTER: Status: ACTIVE | Noted: 2021-03-31

## 2022-11-03 PROBLEM — A49.9 BACTERIAL INFECTION, UNSPECIFIED: Status: ACTIVE | Noted: 2022-04-22

## 2022-11-03 PROBLEM — D53.1 MEGALOBLASTIC ANEMIA DUE TO VITAMIN B12 DEFICIENCY: Status: ACTIVE | Noted: 2022-11-03

## 2022-11-03 PROBLEM — H40.1133 PRIMARY OPEN ANGLE GLAUCOMA OF BOTH EYES, SEVERE STAGE: Status: ACTIVE | Noted: 2019-10-07

## 2022-11-03 PROBLEM — R11.2 NAUSEA WITH VOMITING, UNSPECIFIED: Status: ACTIVE | Noted: 2022-01-06

## 2022-11-03 PROBLEM — E87.70 FLUID OVERLOAD, UNSPECIFIED: Status: ACTIVE | Noted: 2021-06-10

## 2022-11-03 PROBLEM — R06.02 SHORTNESS OF BREATH: Status: ACTIVE | Noted: 2021-03-31

## 2022-11-03 PROBLEM — N18.9 ANEMIA IN CHRONIC KIDNEY DISEASE: Status: ACTIVE | Noted: 2021-03-31

## 2022-11-03 PROBLEM — F32.A DEPRESSIVE DISORDER: Status: ACTIVE | Noted: 2022-11-03

## 2022-11-03 PROBLEM — I15.8 OTHER SECONDARY HYPERTENSION: Status: ACTIVE | Noted: 2021-03-31

## 2022-11-03 PROBLEM — Z23 ENCOUNTER FOR IMMUNIZATION: Status: ACTIVE | Noted: 2021-03-31

## 2022-11-03 PROBLEM — D50.9 IRON DEFICIENCY ANEMIA, UNSPECIFIED: Status: ACTIVE | Noted: 2021-03-31

## 2022-11-03 PROBLEM — Z99.2 ESRD (END STAGE RENAL DISEASE) ON DIALYSIS: Status: ACTIVE | Noted: 2020-07-30

## 2022-11-03 PROBLEM — E87.5 HYPERKALEMIA: Status: ACTIVE | Noted: 2020-07-30

## 2022-11-03 PROBLEM — D68.9 COAGULATION DEFECT, UNSPECIFIED: Status: ACTIVE | Noted: 2021-03-31

## 2022-11-03 PROBLEM — K21.9 GASTROESOPHAGEAL REFLUX DISEASE: Status: ACTIVE | Noted: 2022-11-03

## 2022-11-03 PROCEDURE — 99214 PR OFFICE/OUTPT VISIT, EST, LEVL IV, 30-39 MIN: ICD-10-PCS | Mod: 95,TXP,, | Performed by: NURSE PRACTITIONER

## 2022-11-03 PROCEDURE — 99214 OFFICE O/P EST MOD 30 MIN: CPT | Mod: 95,TXP,, | Performed by: NURSE PRACTITIONER

## 2022-11-03 RX ORDER — HYDROXYCHLOROQUINE SULFATE 200 MG/1
1 TABLET, FILM COATED ORAL DAILY
COMMUNITY
Start: 2022-09-01

## 2022-11-03 RX ORDER — PANTOPRAZOLE SODIUM 40 MG/1
1 TABLET, DELAYED RELEASE ORAL
COMMUNITY

## 2022-11-03 RX ORDER — DORZOLAMIDE HYDROCHLORIDE AND TIMOLOL MALEATE 20; 5 MG/ML; MG/ML
SOLUTION/ DROPS OPHTHALMIC
COMMUNITY

## 2022-11-03 RX ORDER — ACETAMINOPHEN 500 MG
1 TABLET ORAL DAILY
COMMUNITY

## 2022-11-03 RX ORDER — BRIMONIDINE TARTRATE 2 MG/ML
SOLUTION/ DROPS OPHTHALMIC
COMMUNITY

## 2022-11-03 RX ORDER — HYDRALAZINE HYDROCHLORIDE 10 MG/1
TABLET, FILM COATED ORAL
COMMUNITY

## 2022-11-03 RX ORDER — LATANOPROST 50 UG/ML
SOLUTION/ DROPS OPHTHALMIC
COMMUNITY

## 2022-11-03 NOTE — Clinical Note
Please send patient order for external US through [employer]. Also please arrange MELD labs in 3 months, with RTC same day (in person visit only). Orders in Epic. Thanks!

## 2022-11-03 NOTE — TELEPHONE ENCOUNTER
----- Message from Francheska Miranda NP sent at 11/3/2022 12:28 PM CDT -----  Please send patient order for external US through [employer]. Also please arrange MELD labs in 3 months, with RTC same day (in person visit only). Orders in Epic. Thanks!

## 2022-11-03 NOTE — PROGRESS NOTES
The patient location is: Home ([place], [place])  The chief complaint leading to consultation is: Cirrhosis    Visit type: audio only, due to technical issues on patient's end.    Face to Face time with patient: 20  30 minutes of total time spent on the encounter, which includes face to face time and non-face to face time preparing to see the patient (eg, review of tests), Obtaining and/or reviewing separately obtained history, Documenting clinical information in the electronic or other health record, Independently interpreting results (not separately reported) and communicating results to the patient/family/caregiver, or Care coordination (not separately reported).     Each patient to whom he or she provides medical services by telemedicine is:  (1) informed of the relationship between the physician and patient and the respective role of any other health care provider with respect to management of the patient; and (2) notified that he or she may decline to receive medical services by telemedicine and may withdraw from such care at any time.    Notes:     [facility] VISIT ESTABLISHED PT NOTE    REFERRING PROVIDER:  No ref. provider found    CHIEF COMPLAINT: Cirrhosis    HPI: This is a 57 y.o. Other female with PMH noted below, presenting for follow up for cirrhosis. She was last seen in clinic by myself in 10/2022.    The patient's risk factors for NAFLD/PEARL include:     Obesity                                        Yes; BMI 32.91.  Dyslipidemia                                No; Last lipid panel drawn in 3/2022.  Insulin resistance/Diabetes         Yes; History of DM, but not currently on treatment (diet controlled)- last HgbA1c was 4.7% (3/2022)  Family history of diabetes           Yes; Mother and Father with history of DM.    She has a history of ESRD, due to diabetic nephropathy, and is currently undergoing evaluation for kidney transplant. She underwent CT in 12/2021, and again in 6/2022 which  showed a nodular liver contour, suggestive of cirrhosis, with no focal liver lesions or ascites. Fibroscan to stage her liver disease in 10/2022 was suggestive of severe fatty infiltration of the liver (S3), with F4 fibrosis, and a high likelihood of cirrhosis.     Most recent LFT's in 6/2022 showed a T. Bili of 0.6, Albumin of 3.3, ALP of 132, AST of 27, ALT of 17. She also has a history of thrombocytopenia (80's - 130's). She has never undergone a liver biopsy. Most recent MELD-Na was 21, based on labs drawn in 3/2022 (primarily driven by renal function).     Labs show that she is immune to Hepatitis A and B. HCV and HIV negative. She has no known family history of liver disease. She does not drink alcohol heavily or use illicit drugs. Serological work up showed a + KIERAN with an elevated IgG (3,655). Reflex testing showed a + Anti-SSA and Anti-Ssb antibody. Per chart review, patient has a history of Sjogren's syndrome with inflammatory arthritis. She is followed by a local Rheumatologist (Dr. Paulino). She has been prescribed Plaquenil, but does not take it regularly.     She feels well, and denies any signs or symptoms of hepatic decompensation including jaundice, dark urine, abdominal distention, lower extremity edema, hematemesis, melena, or periods of confusion suggestive of hepatic encephalopathy.     Review of patient's allergies indicates:   Allergen Reactions    Amoxicillin      Current Outpatient Medications on File Prior to Visit   Medication Sig Dispense Refill    hydrOXYchloroQUINE (PLAQUENIL) 200 mg tablet Take 1 tablet by mouth once daily.      brimonidine 0.2% (ALPHAGAN) 0.2 % Drop 1 drop into affected eye      carvediloL (COREG) 3.125 MG tablet Take 3.125 mg by mouth.      chlorthalidone (HYGROTEN) 25 MG Tab Take by mouth.      cholecalciferol, vitamin D3, (VITAMIN D3) 50 mcg (2,000 unit) Cap capsule Take 1 tablet by mouth once daily.      diltiaZEM (TIAZAC) 240 MG Cs24 Take 1 capsule by mouth.       dorzolamide-timolol 2-0.5% (COSOPT) 22.3-6.8 mg/mL ophthalmic solution 1 drop into affected eye      folic acid (FOLVITE) 1 MG tablet Take by mouth.      gabapentin (NEURONTIN) 100 MG capsule Take 2 capsules by mouth.      hydrALAZINE (APRESOLINE) 10 MG tablet 1 tablet with food      latanoprost 0.005 % ophthalmic solution 1 drop into affected eye in the evening      pantoprazole (PROTONIX) 40 MG tablet 1 tablet.      sucroferric oxyhydroxide (VELPHORO) 500 mg Chew Take 500 mg by mouth 3 (three) times daily.      [DISCONTINUED] calcium-vitamin D3 (OS- + D3) 500 mg-5 mcg (200 unit) per tablet Take 1 tablet by mouth once daily.      [DISCONTINUED] hydrALAZINE (APRESOLINE) 10 MG tablet Take 10 mg by mouth every 12 (twelve) hours.      [DISCONTINUED] pantoprazole (PROTONIX) 40 mg suspension 40 mg.       No current facility-administered medications on file prior to visit.     PMHX:  has a past medical history of Diabetes mellitus, ESRD (end stage renal disease), Fatty liver disease, nonalcoholic, Hypertension, Sjogren's syndrome with inflammatory arthritis, and Unspecified cirrhosis of liver.    PSHX:  has no past surgical history on file.    FAMILY HISTORY: Negative for liver disease, reviewed in [facility]    SOCIAL HISTORY:   Social History     Tobacco Use   Smoking Status Never   Smokeless Tobacco Never     Social History     Substance and Sexual Activity   Alcohol Use Not Currently     Social History     Substance and Sexual Activity   Drug Use Never     ROS:   GENERAL: Denies fever, chills, weight loss/gain, fatigue  HEENT: Denies headaches, dizziness, vision/hearing changes  CARDIOVASCULAR: Denies chest pain, palpitations, or edema  RESPIRATORY: Denies dyspnea, cough  GI: Denies abdominal pain, rectal bleeding, nausea, vomiting. No change in bowel pattern or color  : Denies dysuria, hematuria   SKIN: Denies rash, itching   NEURO: Denies confusion, memory loss, or mood changes  PSYCH: Denies depression or  anxiety  HEME/LYMPH: Denies easy bruising or bleeding    PHYSICAL EXAM:   Friendly Other female, in no acute distress; alert and oriented to person, place and time.  VITALS: There were no vitals taken for this visit. (Not done - Telehealth visit).   HENT: Unable to assess.  EYES: Unable to assess.  NECK: Unable to assess.  CARDIOVASCULAR: Unable to assess.  RESPIRATORY: Normal respiratory effort.  GI: Unable to assess.  EXTREMITIES: Unable to assess.  SKIN: Unable to assess.  NEURO: Unable to assess.  PSYCH:  Memory intact. Thought and speech pattern appropriate. Behavior normal. No depression or anxiety noted.    DIAGNOSTIC STUDIES:    CT ABDOMEN PELVIS WITHOUT CONTRAST 12/10/2021:    FINDINGS:   There is pulmonary infiltrate in the bilateral lung   bases.  A catheter is noted extending from the inferior vena cava   into right atrium.  There is marked nodularity of the liver   consistent with cirrhosis.  There are stones present in the   gallbladder with no inflammatory changes.  The spleen is mildly   prominent.  The pancreas, bilateral adrenal glands and right kidney   are normal.  There is a 4 mm nonobstructing stone in the left kidney   with no hydronephrosis.   The urinary bladder is decompressed and appears normal.  There is   fluid within the endocervical canal which is abnormal for the   patient's age.  The adnexa are normal in appearance.  A normal   appendix is present in visualized.  There are scattered diverticula   of the distal colon with no associated inflammatory changes.  There   is no free fluid or free air.       IMPRESSION:   1. Pulmonary infiltrates in the bilateral lung bases consistent with   pneumonia.   2. Severe cirrhosis.   3. Cholelithiasis.   4. Scattered diverticulosis with no associated inflammation.   5. Fluid in the endocervical canal which is abnormal for the   patient's age.  Further evaluation with ultrasound would be useful   when clinically feasible.   6. Nonobstructing  nephrolithiasis of the left kidney.    CT ABDOMEN PELVIS WITHOUT CONTRAST 6/11/2022:    FINDINGS:        CT abdomen: Nodular liver contour again evident   suggesting cirrhosis, without focal liver lesion otherwise.   Cholelithiasis noted.  The spleen, pancreas, adrenal glands, and   kidneys are stable without significant focal abnormality.  Small   renal cyst noted.  No retroperitoneal adenopathy or lower quadrant   abnormality.   CT pelvis: No mass, adenopathy, or abnormal fluid collection.     IMPRESSION:   1. No acute findings   2. Incidental findings include evidence of cirrhosis as well as   cholelithiasis.   3. No significant adverse interval changes    FIBROSCAN 10/24/2022:    Findings  Median liver stiffness score:  24  CAP Reading: dB/m:  291     IQR/med %:  15  Interpretation  Fibrosis interpretation is based on medial liver stiffness - Kilopascal (kPa).     Fibrosis Stage:  F4  Steatosis interpretation is based on controlled attenuation parameter - (dB/m).     Steatosis Grade:  S3    EDUCATION:  The disease process and manifestations of cirrhosis were discussed.    Discussed implications of a cirrhosis diagnosis with HCC screenings and EGD and why it is important for us to properly monitor for potential complications.     Signs and symptoms of hepatic decompensation were reviewed, including jaundice, ascites, and slowed mentation due to hepatic encephalopathy. The patient should seek medical attention if any of these things occur.  We discussed the potential for bleeding from esophageal varices with symptoms of hematemesis and melena. The patient should report to the Emergency Department for these symptoms.    We discussed the increased risk of hepatocellular carcinoma due to cirrhosis. Continued screening every six months with ultrasound and AFP is recommended, and was discussed with the patient.     Cirrhosis Counseling  - Strict abstinence of alcohol use (includes beer, wine, and/or liquor).  - avoid  non-steroidal anti-inflammatory drugs (NSAIDs) such as ibuprofen, Motrin, naprosyn, Aleve due to the risk of kidney damage.  - Okay to take acetaminophen (Tylenol), no more than 2,000 mg per day.  - low sodium (salt) 2 gram per day diet.  - high protein diet: 80 grams per day to prevent muscle mass loss. Recommended at least 1 protein shake daily using Premier Protein shakes.  - resistance exercises for muscle strength  - Avoid raw seafoods due to the risk of fatal Vibrio vulnificus infection.  - Recommend an Ultrasound of the liver every 6 months for liver cancer screening.  - Recommend an Upper endoscopy every 1-2 years to screen for varices in the stomach and esophagus    ASSESSMENT & PLAN:  57 y.o. Other female with:    1. Cirrhosis of liver without ascites, unspecified hepatic cirrhosis type  Ambulatory referral/consult to Endo Procedure     US Abdomen Complete    US Abdomen Complete    Comprehensive Metabolic Panel    CBC Auto Differential    Protime-INR      2. Fatty liver disease, nonalcoholic  US Abdomen Complete    US Abdomen Complete    Comprehensive Metabolic Panel    CBC Auto Differential    Protime-INR      3. Positive KIERAN (antinuclear antibody)  Comprehensive Metabolic Panel    CBC Auto Differential    Protime-INR      4. Rheumatoid arthritis, involving unspecified site, unspecified whether rheumatoid factor present  Comprehensive Metabolic Panel    CBC Auto Differential    Protime-INR      5. ESRD (end stage renal disease)  Comprehensive Metabolic Panel    CBC Auto Differential    Protime-INR      6. Organ transplant candidate  Comprehensive Metabolic Panel    CBC Auto Differential    Protime-INR        MELD-Na score: 21 at 10/24/2022  3:34 PM  MELD score: 21 at 10/24/2022  3:34 PM  Calculated from:  Serum Creatinine: On dialysis. Using 4 mg/dL.  Serum Sodium: 137 mmol/L at 10/24/2022  3:34 PM  Total Bilirubin: 1.3 mg/dL at 10/24/2022  3:34 PM  INR(ratio): 1.0 at 10/24/2022  3:34 PM  Age: 57  years    - Obtain medical records from outside Rheumatologist for review.  - Obtain labs in 3 months to monitor liver function.  - Recommend an Ultrasound of the liver, with AFP measurement every 6 months for HCC surveillance, next due 12/2022.  - Recommend EGD now for variceal surveillance.  - Avoid alcohol and herbal supplements/alternative remedies.  - Return to clinic in 3 months.     Thank you for allowing me to participate in the care of Caron Esquivel       Hepatology Nurse Practitioner  [facility]  11/3/2022 @ 0395    CC'ed note to:   No ref. provider found  Primary Doctor No

## 2022-11-03 NOTE — PATIENT INSTRUCTIONS
- Obtain medical records from outside Rheumatologist for review.  - Obtain labs in 3 months to monitor liver function.  - Recommend an Ultrasound of the liver, with AFP measurement every 6 months for HCC surveillance, next due 12/2022.  - Recommend EGD now for variceal surveillance. Please call the Endoscopy scheduling line at [phone] to arrange the exam.   - Avoid alcohol and herbal supplements/alternative remedies.  - Return to clinic in 3 months.

## 2022-11-04 ENCOUNTER — TELEPHONE (OUTPATIENT)
Dept: HEPATOLOGY | Facility: CLINIC | Age: 57
End: 2022-11-04
Payer: MEDICARE

## 2022-11-04 NOTE — TELEPHONE ENCOUNTER
Called patient. No answer. Left Vm about the appointment that I have set up for her in [place] location with labs on the same day. Mailed u/s order to patient.

## 2022-12-29 ENCOUNTER — EPISODE CHANGES (OUTPATIENT)
Dept: TRANSPLANT | Facility: CLINIC | Age: 57
End: 2022-12-29

## 2023-01-23 ENCOUNTER — TELEPHONE (OUTPATIENT)
Dept: TRANSPLANT | Facility: CLINIC | Age: 58
End: 2023-01-23
Payer: MEDICARE

## 2023-01-23 NOTE — TELEPHONE ENCOUNTER
MA notes per Rusty) with [facility]/Adherence Form  FOR THE PAST THREE MONTHS:    4-AMA's more than 15 min per pt request (10/10/22,10/21/22,11/18/122,12/7/22)    3-No-shows 2 unknown and 1 for a doctors appointment (10/12/22,10/24/22,12/21/22)    No concerns with care giving, transportation, or mental health    Brought over to clinic to be scanned in.    Марина Otoole  Abdominal Transplant MA

## 2023-01-27 ENCOUNTER — COMMITTEE REVIEW (OUTPATIENT)
Dept: TRANSPLANT | Facility: CLINIC | Age: 58
End: 2023-01-27
Payer: MEDICARE

## 2023-01-27 NOTE — COMMITTEE REVIEW
Native Organ Dx: Diabetes Mellitus - Type II    Presented to committee 1/27/23.   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    Patient informed of committee's decision. Reports understanding. States her prescription can be called in to her pharmacy, [facility] in [place], [place]. Denies other questions or concerns at this time.       Note written by MICHELLE Massey RN     ===============================================    I was present at the meeting and attest to the decision of the committee

## 2023-01-30 DIAGNOSIS — B78.9 INFECTION DUE TO STRONGYLOIDES: Primary | ICD-10-CM

## 2023-01-30 RX ORDER — IVERMECTIN 3 MG/1
15 TABLET ORAL DAILY
Qty: 20 TABLET | Refills: 0 | Status: CANCELLED | OUTPATIENT
Start: 2023-01-30 | End: 2023-02-03

## 2023-01-30 RX ORDER — IVERMECTIN 3 MG/1
15 TABLET ORAL DAILY
Qty: 20 TABLET | Refills: 0 | Status: SHIPPED | OUTPATIENT
Start: 2023-01-30 | End: 2023-02-03

## 2023-02-06 PROBLEM — J96.01 ACUTE RESPIRATORY FAILURE WITH HYPOXIA: Status: RESOLVED | Noted: 2021-03-27 | Resolved: 2023-02-06

## 2023-02-09 ENCOUNTER — LAB VISIT (OUTPATIENT)
Dept: LAB | Facility: HOSPITAL | Age: 58
End: 2023-02-09
Attending: NURSE PRACTITIONER
Payer: MEDICARE

## 2023-02-09 DIAGNOSIS — N18.6 ESRD (END STAGE RENAL DISEASE): ICD-10-CM

## 2023-02-09 DIAGNOSIS — K76.0 FATTY LIVER DISEASE, NONALCOHOLIC: ICD-10-CM

## 2023-02-09 DIAGNOSIS — R76.8 POSITIVE ANA (ANTINUCLEAR ANTIBODY): ICD-10-CM

## 2023-02-09 DIAGNOSIS — M06.9 RHEUMATOID ARTHRITIS, INVOLVING UNSPECIFIED SITE, UNSPECIFIED WHETHER RHEUMATOID FACTOR PRESENT: ICD-10-CM

## 2023-02-09 DIAGNOSIS — K74.60 CIRRHOSIS OF LIVER WITHOUT ASCITES, UNSPECIFIED HEPATIC CIRRHOSIS TYPE: ICD-10-CM

## 2023-02-09 DIAGNOSIS — Z76.82 ORGAN TRANSPLANT CANDIDATE: ICD-10-CM

## 2023-02-09 LAB
ALBUMIN SERPL BCP-MCNC: 3.4 G/DL (ref 3.5–5.2)
ALP SERPL-CCNC: 106 U/L (ref 55–135)
ALT SERPL W/O P-5'-P-CCNC: 14 U/L (ref 10–44)
ANION GAP SERPL CALC-SCNC: 10 MMOL/L (ref 8–16)
AST SERPL-CCNC: 23 U/L (ref 10–40)
BASOPHILS # BLD AUTO: 0.01 K/UL (ref 0–0.2)
BASOPHILS NFR BLD: 0.4 % (ref 0–1.9)
BILIRUB SERPL-MCNC: 1.3 MG/DL (ref 0.1–1)
BUN SERPL-MCNC: 42 MG/DL (ref 6–20)
CALCIUM SERPL-MCNC: 9.4 MG/DL (ref 8.7–10.5)
CHLORIDE SERPL-SCNC: 95 MMOL/L (ref 95–110)
CO2 SERPL-SCNC: 30 MMOL/L (ref 23–29)
CREAT SERPL-MCNC: 6.3 MG/DL (ref 0.5–1.4)
DIFFERENTIAL METHOD: ABNORMAL
EOSINOPHIL # BLD AUTO: 0.2 K/UL (ref 0–0.5)
EOSINOPHIL NFR BLD: 5.4 % (ref 0–8)
ERYTHROCYTE [DISTWIDTH] IN BLOOD BY AUTOMATED COUNT: 17.4 % (ref 11.5–14.5)
EST. GFR  (NO RACE VARIABLE): 7.1 ML/MIN/1.73 M^2
GLUCOSE SERPL-MCNC: 110 MG/DL (ref 70–110)
HCT VFR BLD AUTO: 33.2 % (ref 37–48.5)
HGB BLD-MCNC: 10.7 G/DL (ref 12–16)
IMM GRANULOCYTES # BLD AUTO: 0.01 K/UL (ref 0–0.04)
IMM GRANULOCYTES NFR BLD AUTO: 0.4 % (ref 0–0.5)
INR PPP: 1 (ref 0.8–1.2)
LYMPHOCYTES # BLD AUTO: 0.7 K/UL (ref 1–4.8)
LYMPHOCYTES NFR BLD: 26.1 % (ref 18–48)
MCH RBC QN AUTO: 35.5 PG (ref 27–31)
MCHC RBC AUTO-ENTMCNC: 32.2 G/DL (ref 32–36)
MCV RBC AUTO: 110 FL (ref 82–98)
MONOCYTES # BLD AUTO: 0.3 K/UL (ref 0.3–1)
MONOCYTES NFR BLD: 9.4 % (ref 4–15)
NEUTROPHILS # BLD AUTO: 1.6 K/UL (ref 1.8–7.7)
NEUTROPHILS NFR BLD: 58.3 % (ref 38–73)
NRBC BLD-RTO: 0 /100 WBC
PLATELET # BLD AUTO: 119 K/UL (ref 150–450)
PMV BLD AUTO: 10.7 FL (ref 9.2–12.9)
POTASSIUM SERPL-SCNC: 4.1 MMOL/L (ref 3.5–5.1)
PROT SERPL-MCNC: 9.1 G/DL (ref 6–8.4)
PROTHROMBIN TIME: 11 SEC (ref 9–12.5)
RBC # BLD AUTO: 3.01 M/UL (ref 4–5.4)
SODIUM SERPL-SCNC: 135 MMOL/L (ref 136–145)
WBC # BLD AUTO: 2.76 K/UL (ref 3.9–12.7)

## 2023-02-09 PROCEDURE — 85025 COMPLETE CBC W/AUTO DIFF WBC: CPT | Mod: AY | Performed by: NURSE PRACTITIONER

## 2023-02-09 PROCEDURE — 36415 COLL VENOUS BLD VENIPUNCTURE: CPT | Performed by: NURSE PRACTITIONER

## 2023-02-09 PROCEDURE — 80053 COMPREHEN METABOLIC PANEL: CPT | Performed by: NURSE PRACTITIONER

## 2023-02-09 PROCEDURE — 85610 PROTHROMBIN TIME: CPT | Performed by: NURSE PRACTITIONER

## 2023-02-10 ENCOUNTER — TELEPHONE (OUTPATIENT)
Dept: HEPATOLOGY | Facility: CLINIC | Age: 58
End: 2023-02-10
Payer: MEDICARE

## 2023-02-10 NOTE — TELEPHONE ENCOUNTER
Contacted patient to reschedule appointment to one of our Mds for kidney transplant clearance. Scheduled patient on 2/16 with Dr. Dowell. Patient asked if its okay to do virtual since they live far and cant drive going to [place]. Patient need to be her in person since a visit is kidney transplant clearance. Daughter is not really sure if they can make it to the appointment.

## 2023-02-10 NOTE — TELEPHONE ENCOUNTER
----- Message from Francheska Miranda NP sent at 2/9/2023  2:43 PM CST -----  Patient was scheduled to see me today for kidney transplant clearance, which should be done by MD. I had to cancel the appointment due to personal illness. Can someone reach out to the patient and arrange an appointment ASAP with one of the Hepatologists? Leisa Salomon has spoken with the patient/daughter and has sent a message as well. Thank You!

## 2023-02-14 ENCOUNTER — TELEPHONE (OUTPATIENT)
Dept: HEPATOLOGY | Facility: CLINIC | Age: 58
End: 2023-02-14
Payer: MEDICARE

## 2023-02-14 NOTE — TELEPHONE ENCOUNTER
----- Message from Keron Suero MA sent at 2/14/2023  1:49 PM CST -----  Regarding: Requesting Virtual  Patient is requesting a virtual appt. Please call and advise.      [place] Daughter   [phone]

## 2023-02-22 ENCOUNTER — TELEPHONE (OUTPATIENT)
Dept: TRANSPLANT | Facility: CLINIC | Age: 58
End: 2023-02-22
Payer: MEDICARE

## 2023-02-22 NOTE — TELEPHONE ENCOUNTER
----- Message from Nneka Hardin MD sent at 2/22/2023  3:54 PM CST -----  Regarding: RE: Patient discussion  I think We can close her and she can come back to [facility] clinic with hepatology clearance.  ID does not to see her.   ----- Message -----  From: Pat Massey RN  Sent: 2/22/2023   2:42 PM CST  To: Nneka Hardin MD, Milvia Husain NP  Subject: RE: Patient discussion                           Yes, I sent you a prescription so the patient could have it filled at her local pharmacist.   ----- Message -----  From: Nneka Hardin MD  Sent: 2/22/2023  12:14 PM CST  To: Milvia Husain NP, Pat Msasey RN  Subject: RE: Patient discussion                             Hi,    As far as I remember, we offered her treatment for strongyloides, right?     ----- Message -----  From: Pat Massey RN  Sent: 2/22/2023  12:08 PM CST  To: Nneka Hardin MD, Milvia Husain NP  Subject: Patient discussion                               This patient has been in evaluation since 3/3/22. 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 The patient has rescheduled hepatology appt to 4/27/23 due to availability of her transportation.     Would you like this patient to be discussed for closure, and she can return with hepatology clearance and treatment for positive strongyloides, or allow her to continue in evaluation?    Thanks for your time,  Pat

## 2023-02-24 ENCOUNTER — COMMITTEE REVIEW (OUTPATIENT)
Dept: TRANSPLANT | Facility: CLINIC | Age: 58
End: 2023-02-24
Payer: MEDICARE

## 2023-02-24 NOTE — COMMITTEE REVIEW
Native Organ Dx: Diabetes Mellitus - Type II      Not approved for LRD/CAD transplant due to failure to complete kidney transplant evaluation. Patient remains outstanding for hepatology follow up and clearance due to history of cirrhosis. Patient is currently having transportation issues. Patient can be re-referred after receiving hepatology follow up and clearance.       Note written by MICHELLE Massey RN     ===============================================    I was present at the meeting and attest to the decision of the committee

## 2023-02-24 NOTE — LETTER
February 24, 2023    Caron Esquivel  [address] [place] [address]    Dear Caron Esquivel:  MRN: [id number]    It is the duty of the [facility] to determine which patients are candidates for a transplant. For this reason, our committee has the difficult task of evaluating patients to determine which ones have the greatest chance of having a successful transplant. We are aware of the magnitude of this responsibility, and we approach it with reverence and humility.    It is with regret I inform you that you are not approved as a transplant candidate due to  failure to complete kidney transplant evaluation. Patient remains outstanding for hepatology follow up and clearance due to history of cirrhosis . You can be re-referred for a kidney transplant after your follow up with hepatology is complete.  Based on this review, we have determined that at this time, you are not a candidate for a transplant at [facility].      The selection committee carefully considers each patient's transplant candidacy and determines whether it is safe to proceed with transplantation on a case-by-case basis using established selection criteria.  At present, the risk of proceeding with an elective transplant surgery has become too high.                                                                               Although the selection committee believes you are not a suitable transplant candidate, you have the option to be evaluated at other transplant centers who may have different selection criteria.  You may request your [facility] records be sent to any center of your choice by contacting our Medical Records Department at [phone].                                                                               Attached is a letter from the [employer] Network for Organ Sharing (UNOS).  It describes the services and information offered to patients by UNOS and the Organ Procurement and Transplant  Network.    The [facility] sincerely wishes you the best and remains available to answer any questions.  Please do not hesitate to contact our pre-transplant office if we can assist you in any other way.                                                                               Sincerely,      Joan Boswell MD  Medical Director, Kidney & Kidney/Pancreas Transplantation    CC:  Bo Ramsey Jr., MD           [facility]    Encl: UNOS Letter               The Organ Procurement and Transplantation Network   Toll-free patient services line: Your resource for organ transplant information     If you have a question regarding your own medical care, you always should call your transplant hospital first. However, for general organ transplant-related information, you can call the Organ Procurement and Transplantation Network (OPTN) toll-free patient services line at 1-299.716.2796.     Anyone, including potential transplant candidates, candidates, recipients, family members, friends, living donors, and donor family members, can call this number to:     Talk about organ donation, living donation, the transplant process, the donation process, and transplant policies.   Get a free patient information kit with helpful booklets, waiting list and transplant information, and a list of all transplant hospitals.   Ask questions about the OPTN website (https://optn.transplant.hrsa.gov/), the [employer] Network for Organ Sharings (UNOS) website (https://unos.org/), or the UNOS website for living donors and transplant recipients. (https://www.transplantliving.org/).   Learn how the OPTN can help you.   Talk about any concerns that you may have with a transplant hospital.     The nations transplant system, the OPTN, is managed under federal contract by the [employer] Network for Organ Sharing (UNOS), which is a non-profit charitable organization. The OPTN helps create and define organ sharing policies  that make the best use of donated organs. This process continuously evaluating new advances and discoveries so policies can be adapted to best serve patients waiting for transplants. To do so, the OPTN works closely with transplant professionals, transplant patients, transplant candidates, donor families, living donors, and the public. All transplant programs and organ procurement organizations throughout the country are OPTN members and are obligated to follow the policies the OPTN creates for allocating organs.     The OPTN also is responsible for:   Providing educational material for patients, the public, and professionals.   Raising awareness of the need for donated organs and tissue.   Coordinating organ procurement, matching, and placement.   Collecting information about every organ transplant and donation that occurs in the [place].     Remember, you should contact your transplant hospital directly if you have questions or concerns about your own medical care including medical records, work-up progress, and test results.     We are not your transplant hospital, and our staff will not be able to answer questions about your case, so please keep your transplant hospitals phone number handy.   However, while you research your transplant needs and learn as much as you can about transplantation and donation, we welcome your call to our toll-free patient services line at [phone].

## 2023-04-27 ENCOUNTER — LAB VISIT (OUTPATIENT)
Dept: LAB | Facility: HOSPITAL | Age: 58
End: 2023-04-27
Payer: MEDICARE

## 2023-04-27 ENCOUNTER — OFFICE VISIT (OUTPATIENT)
Dept: HEPATOLOGY | Facility: CLINIC | Age: 58
End: 2023-04-27
Payer: MEDICARE

## 2023-04-27 VITALS
WEIGHT: 167 LBS | TEMPERATURE: 98 F | HEIGHT: 61 IN | BODY MASS INDEX: 31.53 KG/M2 | OXYGEN SATURATION: 96 % | SYSTOLIC BLOOD PRESSURE: 210 MMHG | DIASTOLIC BLOOD PRESSURE: 80 MMHG | HEART RATE: 80 BPM

## 2023-04-27 DIAGNOSIS — K74.69 OTHER CIRRHOSIS OF LIVER: ICD-10-CM

## 2023-04-27 DIAGNOSIS — Z99.2 ESRD (END STAGE RENAL DISEASE) ON DIALYSIS: ICD-10-CM

## 2023-04-27 DIAGNOSIS — K76.0 FATTY LIVER DISEASE, NONALCOHOLIC: Primary | ICD-10-CM

## 2023-04-27 DIAGNOSIS — K76.82 HEPATIC ENCEPHALOPATHY: ICD-10-CM

## 2023-04-27 DIAGNOSIS — N18.6 ESRD (END STAGE RENAL DISEASE) ON DIALYSIS: ICD-10-CM

## 2023-04-27 LAB
AFP SERPL-MCNC: 2.3 NG/ML (ref 0–8.4)
ALBUMIN SERPL BCP-MCNC: 3.5 G/DL (ref 3.5–5.2)
ALP SERPL-CCNC: 129 U/L (ref 55–135)
ALT SERPL W/O P-5'-P-CCNC: 9 U/L (ref 10–44)
AMMONIA PLAS-SCNC: 42 UMOL/L (ref 10–50)
ANION GAP SERPL CALC-SCNC: 13 MMOL/L (ref 8–16)
AST SERPL-CCNC: 20 U/L (ref 10–40)
BASOPHILS # BLD AUTO: 0.02 K/UL (ref 0–0.2)
BASOPHILS NFR BLD: 0.4 % (ref 0–1.9)
BILIRUB DIRECT SERPL-MCNC: 0.4 MG/DL (ref 0.1–0.3)
BILIRUB SERPL-MCNC: 0.9 MG/DL (ref 0.1–1)
BUN SERPL-MCNC: 41 MG/DL (ref 6–20)
CALCIUM SERPL-MCNC: 10 MG/DL (ref 8.7–10.5)
CHLORIDE SERPL-SCNC: 94 MMOL/L (ref 95–110)
CO2 SERPL-SCNC: 28 MMOL/L (ref 23–29)
CREAT SERPL-MCNC: 5.8 MG/DL (ref 0.5–1.4)
DIFFERENTIAL METHOD: ABNORMAL
EOSINOPHIL # BLD AUTO: 0 K/UL (ref 0–0.5)
EOSINOPHIL NFR BLD: 0.7 % (ref 0–8)
ERYTHROCYTE [DISTWIDTH] IN BLOOD BY AUTOMATED COUNT: 14.9 % (ref 11.5–14.5)
EST. GFR  (NO RACE VARIABLE): 7.8 ML/MIN/1.73 M^2
GLUCOSE SERPL-MCNC: 146 MG/DL (ref 70–110)
HCT VFR BLD AUTO: 33.7 % (ref 37–48.5)
HGB BLD-MCNC: 11.4 G/DL (ref 12–16)
IMM GRANULOCYTES # BLD AUTO: 0.02 K/UL (ref 0–0.04)
IMM GRANULOCYTES NFR BLD AUTO: 0.4 % (ref 0–0.5)
INR PPP: 1 (ref 0.8–1.2)
LYMPHOCYTES # BLD AUTO: 0.5 K/UL (ref 1–4.8)
LYMPHOCYTES NFR BLD: 9.7 % (ref 18–48)
MCH RBC QN AUTO: 34.9 PG (ref 27–31)
MCHC RBC AUTO-ENTMCNC: 33.8 G/DL (ref 32–36)
MCV RBC AUTO: 103 FL (ref 82–98)
MONOCYTES # BLD AUTO: 0.3 K/UL (ref 0.3–1)
MONOCYTES NFR BLD: 6.3 % (ref 4–15)
NEUTROPHILS # BLD AUTO: 4.5 K/UL (ref 1.8–7.7)
NEUTROPHILS NFR BLD: 82.5 % (ref 38–73)
NRBC BLD-RTO: 0 /100 WBC
PLATELET # BLD AUTO: 153 K/UL (ref 150–450)
PMV BLD AUTO: 11.1 FL (ref 9.2–12.9)
POTASSIUM SERPL-SCNC: 3.7 MMOL/L (ref 3.5–5.1)
PROT SERPL-MCNC: 9.2 G/DL (ref 6–8.4)
PROTHROMBIN TIME: 10.5 SEC (ref 9–12.5)
RBC # BLD AUTO: 3.27 M/UL (ref 4–5.4)
SODIUM SERPL-SCNC: 135 MMOL/L (ref 136–145)
WBC # BLD AUTO: 5.44 K/UL (ref 3.9–12.7)

## 2023-04-27 PROCEDURE — 82248 BILIRUBIN DIRECT: CPT | Performed by: INTERNAL MEDICINE

## 2023-04-27 PROCEDURE — 99215 PR OFFICE/OUTPT VISIT, EST, LEVL V, 40-54 MIN: ICD-10-PCS | Mod: S$PBB,,, | Performed by: INTERNAL MEDICINE

## 2023-04-27 PROCEDURE — 99999 PR PBB SHADOW E&M-EST. PATIENT-LVL V: CPT | Mod: PBBFAC,,, | Performed by: INTERNAL MEDICINE

## 2023-04-27 PROCEDURE — 99215 OFFICE O/P EST HI 40 MIN: CPT | Mod: S$PBB,,, | Performed by: INTERNAL MEDICINE

## 2023-04-27 PROCEDURE — 99215 OFFICE O/P EST HI 40 MIN: CPT | Mod: PBBFAC,PN | Performed by: INTERNAL MEDICINE

## 2023-04-27 PROCEDURE — 85025 COMPLETE CBC W/AUTO DIFF WBC: CPT | Performed by: INTERNAL MEDICINE

## 2023-04-27 PROCEDURE — 85610 PROTHROMBIN TIME: CPT | Performed by: INTERNAL MEDICINE

## 2023-04-27 PROCEDURE — 99999 PR PBB SHADOW E&M-EST. PATIENT-LVL V: ICD-10-PCS | Mod: PBBFAC,,, | Performed by: INTERNAL MEDICINE

## 2023-04-27 PROCEDURE — 80053 COMPREHEN METABOLIC PANEL: CPT | Performed by: INTERNAL MEDICINE

## 2023-04-27 PROCEDURE — 80321 ALCOHOLS BIOMARKERS 1OR 2: CPT | Performed by: INTERNAL MEDICINE

## 2023-04-27 PROCEDURE — 82140 ASSAY OF AMMONIA: CPT | Performed by: INTERNAL MEDICINE

## 2023-04-27 PROCEDURE — 36415 COLL VENOUS BLD VENIPUNCTURE: CPT | Mod: PN | Performed by: INTERNAL MEDICINE

## 2023-04-27 PROCEDURE — 82105 ALPHA-FETOPROTEIN SERUM: CPT | Performed by: INTERNAL MEDICINE

## 2023-04-27 RX ORDER — LACTULOSE 10 G/15ML
20 SOLUTION ORAL 2 TIMES DAILY
Qty: 3000 ML | Refills: 11 | Status: SHIPPED | OUTPATIENT
Start: 2023-04-27 | End: 2023-06-16

## 2023-04-27 RX ORDER — PREDNISONE 5 MG/1
5 TABLET ORAL DAILY
COMMUNITY

## 2023-04-27 NOTE — LETTER
April 27, 2023      [facility] - Hepatology  [address] [address]  Phone: [phone]  Fax: [phone]       Patient: Caron Esquivel   YOB: 1965  Date of Visit: 04/27/2023    To Whom It May Concern:    Florence Liu was at [facility] on 04/27/2023 with her mother Caron Esquivel. She may return to work on 04/27/2023 with no restrictions. If you have any questions or concerns, or if I can be of further assistance, please do not hesitate to contact me.    Sincerely,    Eusebio Garcia MA

## 2023-04-27 NOTE — PATIENT INSTRUCTIONS
Labs today and then every 12 weeks  Talk to Dr Parikh about doing an EGD and have him send me a copy of the report  Abdo  every 6 months to look for cancer  L-K transplant evaluation  Return 3-4 months  Start lactulose

## 2023-04-27 NOTE — PROGRESS NOTES
HEPATOLOGY FOLLOW UP    Referring Physician: Vitaliy Hess MD   Current Corresponding Physician: Vitaliy Hess MD, Shawn Crisostomo MD, Dakota Parikh MD    Caron Esquivel is here for follow up of PEARL-induced     HPI  Pt is a 58 yr old female with a hx of DM, elevated BMI, HLD, ESRD (declined for kidney tx due to cirrhosis and transportation issues) who has been evaluated by one of the Hepatology APPS:    CT abdo 12/21 and 6/22: showed a nodular liver  Fibroscan 10/24/22: S3 (>67% steatosis), F4 fibrosis  Serologic w/u negative for other causes of CLD  EGD: ND    Interval History  Since Caron Esquivel's last visit:    Ascites: none  HE: none  EGD: ND  HCC screening: ND    MELD-Na score: 22 at 2/9/2023 11:33 AM  MELD score: 21 at 2/9/2023 11:33 AM  Calculated from:  Serum Creatinine: On dialysis. Using 4 mg/dL.  Serum Sodium: 135 mmol/L at 2/9/2023 11:33 AM  Total Bilirubin: 1.3 mg/dL at 2/9/2023 11:33 AM  INR(ratio): 1.0 at 2/9/2023 11:33 AM  Age: 58 years     Outpatient Encounter Medications as of 4/27/2023   Medication Sig Dispense Refill    brimonidine 0.2% (ALPHAGAN) 0.2 % Drop 1 drop into affected eye      carvediloL (COREG) 3.125 MG tablet Take 3.125 mg by mouth.      chlorthalidone (HYGROTEN) 25 MG Tab Take by mouth.      cholecalciferol, vitamin D3, (VITAMIN D3) 50 mcg (2,000 unit) Cap capsule Take 1 tablet by mouth once daily.      diltiaZEM (TIAZAC) 240 MG Cs24 Take 1 capsule by mouth.      dorzolamide-timolol 2-0.5% (COSOPT) 22.3-6.8 mg/mL ophthalmic solution 1 drop into affected eye      folic acid (FOLVITE) 1 MG tablet Take by mouth.      gabapentin (NEURONTIN) 100 MG capsule Take 2 capsules by mouth.      hydrALAZINE (APRESOLINE) 10 MG tablet 1 tablet with food      hydrOXYchloroQUINE (PLAQUENIL) 200 mg tablet Take 1 tablet by mouth once daily.      latanoprost 0.005 % ophthalmic solution 1 drop into affected eye in the evening      pantoprazole (PROTONIX) 40 MG tablet 1 tablet.       predniSONE (DELTASONE) 5 MG tablet Take 5 mg by mouth once daily.      sucroferric oxyhydroxide (VELPHORO) 500 mg Chew Take 500 mg by mouth 3 (three) times daily.       No facility-administered encounter medications on file as of 4/27/2023.     Review of patient's allergies indicates:   Allergen Reactions    Amoxicillin Other (See Comments)     Past Medical History:   Diagnosis Date    Diabetes mellitus     ESRD (end stage renal disease)     Fatty liver disease, nonalcoholic     Hypertension     Sjogren's syndrome with inflammatory arthritis     Unspecified cirrhosis of liver        Review of Systems   Constitutional: Negative.    HENT: Negative.     Eyes: Negative.    Respiratory: Negative.     Cardiovascular: Negative.    Gastrointestinal: Negative.    Genitourinary: Negative.    Musculoskeletal: Negative.    Skin: Negative.    Neurological: Negative.    Psychiatric/Behavioral: Negative.       Vitals:    04/27/23 1101   BP: (!) 210/80   Pulse:    Temp:         Physical Exam  Vitals reviewed.   Constitutional:       Appearance: She is well-developed.   HENT:      Head: Normocephalic and atraumatic.   Eyes:      General: No scleral icterus.     Conjunctiva/sclera: Conjunctivae normal.      Pupils: Pupils are equal, round, and reactive to light.   Neck:      Thyroid: No thyromegaly.   Cardiovascular:      Rate and Rhythm: Normal rate and regular rhythm.      Heart sounds: Normal heart sounds.   Pulmonary:      Effort: Pulmonary effort is normal.      Breath sounds: Normal breath sounds. No rales.   Abdominal:      General: Bowel sounds are normal. There is no distension.      Palpations: Abdomen is soft. There is no mass.      Tenderness: There is no abdominal tenderness.   Musculoskeletal:         General: Normal range of motion.      Cervical back: Normal range of motion and neck supple.   Skin:     General: Skin is warm and dry.      Findings: No rash.   Neurological:      Mental Status: She is alert and oriented  to person, place, and time.       MELD-Na score: 22 at 2/9/2023 11:33 AM  MELD score: 21 at 2/9/2023 11:33 AM  Calculated from:  Serum Creatinine: On dialysis. Using 4 mg/dL.  Serum Sodium: 135 mmol/L at 2/9/2023 11:33 AM  Total Bilirubin: 1.3 mg/dL at 2/9/2023 11:33 AM  INR(ratio): 1.0 at 2/9/2023 11:33 AM  Age: 58 years    Lab Results   Component Value Date     02/09/2023    BUN 42 (H) 02/09/2023    CREATININE 6.3 (H) 02/09/2023    CALCIUM 9.4 02/09/2023     (L) 02/09/2023    K 4.1 02/09/2023    CL 95 02/09/2023    PROT 9.1 (H) 02/09/2023    CO2 30 (H) 02/09/2023    ANIONGAP 10 02/09/2023    WBC 2.76 (L) 02/09/2023    RBC 3.01 (L) 02/09/2023    HGB 10.7 (L) 02/09/2023    HCT 33.2 (L) 02/09/2023     (H) 02/09/2023    MCH 35.5 (H) 02/09/2023    MCHC 32.2 02/09/2023     Lab Results   Component Value Date    RDW 17.4 (H) 02/09/2023     (L) 02/09/2023    MPV 10.7 02/09/2023    GRAN 1.6 (L) 02/09/2023    GRAN 58.3 02/09/2023    LYMPH 0.7 (L) 02/09/2023    LYMPH 26.1 02/09/2023    MONO 0.3 02/09/2023    MONO 9.4 02/09/2023    EOSINOPHIL 5.4 02/09/2023    BASOPHIL 0.4 02/09/2023    EOS 0.2 02/09/2023    BASO 0.01 02/09/2023    APTT 27.3 03/03/2022    GROUPTRH O POS 03/03/2022    CHOL 133 03/03/2022    TRIG 99 03/03/2022    HDL 54 03/03/2022    CHOLHDL 40.6 03/03/2022    TOTALCHOLEST 2.5 03/03/2022    ALBUMIN 3.4 (L) 02/09/2023    BILIDIR 0.3 03/03/2022    AST 23 02/09/2023    ALT 14 02/09/2023    ALKPHOS 106 02/09/2023    LABPROT 11.0 02/09/2023    INR 1.0 02/09/2023       Assessment and Plan:  Caron Esquivel is a 58 y.o. female with PEARL cirrhosis, compensated. He has ESRD on HD. The patient is interested in pursuing transplant. Given protal htn, I am recommending a L-K transplant evaluation. In speaking to the patient and her daughter, they feel the patient is motivated for transplant and the daughter will help with transportation. Current recommendations:  Cirrhosis, MELD 22:recommend L-K  transplant evalutaion; meld labs every 3 months; HCC screening every 6 months with abdo US and AFP  Portal htn: EGD- r/o EV  Memory problems: start lactulose  Return 3-4 months

## 2023-04-29 PROBLEM — K76.82 HEPATIC ENCEPHALOPATHY: Status: ACTIVE | Noted: 2023-04-29

## 2023-05-02 ENCOUNTER — TELEPHONE (OUTPATIENT)
Dept: TRANSPLANT | Facility: CLINIC | Age: 58
End: 2023-05-02

## 2023-05-02 ENCOUNTER — TELEPHONE (OUTPATIENT)
Dept: HEPATOLOGY | Facility: CLINIC | Age: 58
End: 2023-05-02
Payer: MEDICARE

## 2023-05-02 LAB
CLINICAL BIOCHEMIST REVIEW: NORMAL
PLPETH BLD-MCNC: <10 NG/ML
POPETH BLD-MCNC: <10 NG/ML

## 2023-05-02 NOTE — TELEPHONE ENCOUNTER
Message sent secure chat to Dr. Dowell for an answer----- Message from Uzma Contreras sent at 5/2/2023 12:11 PM CDT -----  Contact: pt  Pt requesting call back RE: would like to know if she should take rx below, also pt stated Dr. Mcgregor has not called yet for appt    sucroferric oxyhydroxide (VELPHORO) 500 mg Chew    Confirmed contact below:  Contact Name:Caron Esquivel  Phone Number: [phone]

## 2023-05-02 NOTE — TELEPHONE ENCOUNTER
Referral received from Dr. [place] HOANG  Initial  referral from Vitaliy Hess MD   Patient with Cirrhosis, MELD 22:recommend L-K transplant evalutaion.   ICD-10:  k74.60   Referred for LIVER AND KIDNEY Transplant for  EVALUATION.    Referral completed and forwarded to [facility].          Insurance: Epic   PRIMARY:   ID:  Contact #     SECONDARY:   ID:  Contact #

## 2023-05-02 NOTE — TELEPHONE ENCOUNTER
Call back to patient that if she would rather get an EGD there in her home town, her PCP or gastro MD would have to order that  Voice message unable to be left ----- Message from [place] Dowell MD sent at 5/2/2023 11:20 AM CDT -----  Regarding: RE: Patient Care Assistance  Plan was for her to see her local GI. I cant order external EGD  ----- Message -----  From: Renetta Pruett MA  Sent: 5/2/2023   8:53 AM CDT  To: [place] Dowell MD  Subject: FW: Patient Care Assistance                      Need external EGD orders.... or should she see her PCP or Gastro in that town / facility??  ----- Message -----  From: Eusebio Garcia MA  Sent: 4/28/2023   2:38 PM CDT  To: Renetta Pruett MA  Subject: FW: Patient Care Assistance                        ----- Message -----  From: German Munguia  Sent: 4/28/2023  12:10 PM CDT  To: Magalys Bartholomew Staff  Subject: Patient Care Assistance                          Patient called in to states she is aware that provider wants her to have an EGD. She is requesting that provider send over the orders to [facility] so that she may proceed with the scheduling of procedure. No other details. Requested a call back to advise/assist further if needed.             Fax: [phone]                Contact: [phone]

## 2023-05-02 NOTE — TELEPHONE ENCOUNTER
Spoke with Caron and given the message to take meds  ----- Message from Florida Dowell MD sent at 5/2/2023  3:15 PM CDT -----  It is ok to take. Let her know  ----- Message -----  From: Renetta Pruett MA  Sent: 5/2/2023  12:22 PM CDT  To: Flroida Dowell MD    Contact: pt  Pt requesting call back RE: would like to know if she should take rx below, also pt stated Dr. Mcgregor has not called yet for appt     sucroferric oxyhydroxide (VELPHORO) 500 mg Chew     Confirmed contact below:  Contact Name:Caron Alvin  Phone Number: [phone]

## 2023-05-10 ENCOUNTER — TELEPHONE (OUTPATIENT)
Dept: TRANSPLANT | Facility: CLINIC | Age: 58
End: 2023-05-10
Payer: MEDICARE

## 2023-05-10 ENCOUNTER — TELEPHONE (OUTPATIENT)
Dept: HEPATOLOGY | Facility: CLINIC | Age: 58
End: 2023-05-10
Payer: MEDICARE

## 2023-05-10 DIAGNOSIS — Z53.1 BLOOD TRANSFUSION DECLINED BECAUSE PATIENT IS JEHOVAH'S WITNESS: ICD-10-CM

## 2023-05-10 NOTE — TELEPHONE ENCOUNTER
Called patient today to advise that she is financially approved to come here for a liver/kidney evaluation.  Asked her regarding her wish not to receive blood products.  She confirmed this.  I discussed with Dr Dowell who will speak to the patient.  Added for discussion with the team for next week.

## 2023-05-10 NOTE — TELEPHONE ENCOUNTER
Liver Transplant Committee Discussion     Patient Name: Caron Esquivel   : 1965  MRN: [id number]    Requested by: Florida Dowell MD    Day to be discussed: Liver discussion days: Tuesday    Transplant Coordinator: Liver Coordinators: Irina Patton    Patient Status: outpatient    Transplant Status: transplant status: Pre-liver    Reason for Discussion:  Approved for liver/kidney transplant but is [employer] and doesn't want to receive blood products.    Plan:    Route to:  [facility]ili[facility] Pereira

## 2023-05-10 NOTE — TELEPHONE ENCOUNTER
Call to pt re being  [place]. Explained would likely need a blood transfuion(s) during a liver transplant operation. Will discuss at selection next Tuesday to confirm can not be a candidate.

## 2023-05-16 ENCOUNTER — TELEPHONE (OUTPATIENT)
Dept: TRANSPLANT | Facility: CLINIC | Age: 58
End: 2023-05-16
Payer: MEDICARE

## 2023-05-16 NOTE — TELEPHONE ENCOUNTER
Called patient and notified that she has been denied here due to unwillingness to receive blood products.  Callled and spoke to Dr Hess and advised of same.  Sent recent notes to Dr Hess.  She will refer to [facility].  Transplant episodes closed.